# Patient Record
Sex: MALE | Race: WHITE | NOT HISPANIC OR LATINO | Employment: OTHER | URBAN - METROPOLITAN AREA
[De-identification: names, ages, dates, MRNs, and addresses within clinical notes are randomized per-mention and may not be internally consistent; named-entity substitution may affect disease eponyms.]

---

## 2016-12-28 LAB — INR PPP: 2.9 (ref 2–3.5)

## 2017-01-03 ENCOUNTER — ANTICOAGULATION MONITORING (OUTPATIENT)
Dept: VASCULAR LAB | Facility: MEDICAL CENTER | Age: 82
End: 2017-01-03

## 2017-01-03 DIAGNOSIS — I26.99 PULMONARY EMBOLISM, OTHER: ICD-10-CM

## 2017-01-03 NOTE — PROGRESS NOTES
Anticoagulation Summary as of 1/3/2017     INR goal 2.0-3.0   Selected INR 2.9 (12/28/2016)   Maintenance plan 2.5 mg (2.5 mg x 1) every day   Weekly total 17.5 mg   No change documented Mynor Capellan Med Ass't   Plan last modified Parisa Vences, PHARMD (6/11/2015)   Next INR check 3/8/2017   Target end date Indefinite    Indications   Pulmonary embolism [415.19] [I26.99]         Anticoagulation Episode Summary     INR check location Outside Lab    Preferred lab     Send INR reminders to     Comments [facility]      Anticoagulation Care Providers     Provider Role Specialty Phone number    SHERLY Cope Responsible Cardiology [phone]    SHERLY Garcia Responsible Cardiology [phone]        Anticoagulation Patient Findings   Negatives Missed Doses, Extra Doses, Medication Changes, Antibiotic Use, Diet Changes, Dental/Other Procedures, Hospitalization, Bleeding Gums, Nose Bleeds, Blood in Urine, Blood in Stool, Any Bruising, Other Complaints        Spoke with patient to report a therapeutic INR.  Pt instructed to continue with current warfarin dosing regimen. Pt denies any s/s of bleeding, bruising, clotting or any changes to diet or medication.  Will follow up in 10 weeks.    Mynor Capellan Med Ass't    Agree with plan of care as stated above.    Tanya HEARD

## 2017-03-08 LAB — INR PPP: 2.4 (ref 2–3.5)

## 2017-03-10 ENCOUNTER — ANTICOAGULATION MONITORING (OUTPATIENT)
Dept: VASCULAR LAB | Facility: MEDICAL CENTER | Age: 82
End: 2017-03-10

## 2017-03-10 DIAGNOSIS — I26.99 PULMONARY EMBOLISM, OTHER: ICD-10-CM

## 2017-03-11 NOTE — PROGRESS NOTES
Anticoagulation Summary as of 3/10/2017     INR goal 2.0-3.0   Selected INR 2.4 (3/8/2017)   Maintenance plan 2.5 mg (2.5 mg x 1) every day   Weekly total 17.5 mg   No change documented Mynor Capellan Med Ass't   Plan last modified Parisa Vences, PHARMD (6/11/2015)   Next INR check 5/17/2017   Target end date Indefinite    Indications   Pulmonary embolism [415.19] [I26.99]         Anticoagulation Episode Summary     INR check location Outside Lab    Preferred lab     Send INR reminders to     Comments [facility]      Anticoagulation Care Providers     Provider Role Specialty Phone number    SHERLY Cope Responsible Cardiology [phone]    SHERLY Garcia Responsible Cardiology [phone]        Anticoagulation Patient Findings   Negatives Missed Doses, Extra Doses, Medication Changes, Antibiotic Use, Diet Changes, Dental/Other Procedures, Hospitalization, Bleeding Gums, Nose Bleeds, Blood in Urine, Blood in Stool, Any Bruising, Other Complaints        Spoke with patient to report a therapeutic INR.  Pt instructed to continue with current warfarin dosing regimen. Pt denies any s/s of bleeding, bruising, clotting or any changes to diet or medication.  Will follow up in 10 weeks.    Mynor Capellan Med Ass't    Agree with plan of care as stated above.    Tanya HEARD

## 2017-05-10 LAB — INR PPP: 3 (ref 2–3.5)

## 2017-05-25 ENCOUNTER — ANTICOAGULATION MONITORING (OUTPATIENT)
Dept: VASCULAR LAB | Facility: MEDICAL CENTER | Age: 82
End: 2017-05-25

## 2017-05-25 DIAGNOSIS — I27.82 CHRONIC SEPTIC PULMONARY EMBOLISM WITH ACUTE COR PULMONALE (HCC): ICD-10-CM

## 2017-05-25 DIAGNOSIS — I26.01 CHRONIC SEPTIC PULMONARY EMBOLISM WITH ACUTE COR PULMONALE (HCC): ICD-10-CM

## 2017-05-25 NOTE — PROGRESS NOTES
Anticoagulation Summary as of 5/25/2017     INR goal 2.0-3.0   Selected INR 3.0 (5/10/2017)   Maintenance plan 2.5 mg (2.5 mg x 1) every day   Weekly total 17.5 mg   Plan last modified Parisa Vences, PHARMD (6/11/2015)   Next INR check 8/2/2017   Target end date Indefinite    Indications   Pulmonary embolism [415.19] [I26.99]         Anticoagulation Episode Summary     INR check location Outside Lab    Preferred lab     Send INR reminders to     [facility]      Anticoagulation Care Providers     Provider Role Specialty Phone number    SHERLY Cope Responsible Cardiology [phone]    SHERLY Garcia Responsible Cardiology [phone]        Anticoagulation Patient Findings    Spoke with Mrs. Louise to report a therapeutic INR of 3.0 (from 5-10). Pt is to continue with current warfarin dosing regimen.  Pt denies any unusual s/s of bleeding, bruising, clotting or any changes to diet or medications.  Follow up in 12 weeks.    Claire Melendez, PHARMD

## 2017-07-05 ENCOUNTER — ANTICOAGULATION MONITORING (OUTPATIENT)
Dept: VASCULAR LAB | Facility: MEDICAL CENTER | Age: 82
End: 2017-07-05

## 2017-07-05 DIAGNOSIS — I27.82 CHRONIC SEPTIC PULMONARY EMBOLISM WITH ACUTE COR PULMONALE (HCC): ICD-10-CM

## 2017-07-05 DIAGNOSIS — I26.01 CHRONIC SEPTIC PULMONARY EMBOLISM WITH ACUTE COR PULMONALE (HCC): ICD-10-CM

## 2017-07-05 LAB — INR PPP: 2.1 (ref 2–3.5)

## 2017-07-05 NOTE — PROGRESS NOTES
Anticoagulation Summary as of 7/5/2017     INR goal 2.0-3.0   Selected INR 2.1 (7/5/2017)   Maintenance plan 2.5 mg (2.5 mg x 1) every day   Weekly total 17.5 mg   Plan last modified Parisa Vences, PHARMD (6/11/2015)   Next INR check 7/19/2017   Target end date Indefinite    Indications   Pulmonary embolism [415.19] [I26.99]         Anticoagulation Episode Summary     INR check location Outside Lab    Preferred lab     Send INR reminders to     [facility]      Anticoagulation Care Providers     Provider Role Specialty Phone number    SHERLY Cope Responsible Cardiology [phone]    SHERLY Garcia Responsible Cardiology [phone]        Anticoagulation Patient Findings    Spoke to the patient's wife on the phone. Patients wife states that patient had been having some blood in his urine which took him to the hospital. He was then instructed by PCP to hold warfarin for a couple of days until bleeding resolves. He was also started on an antibiotic Patient's INR is therapeutic today at 2.1. Patient was instructed to recheck INR 72 hours after re-starting warfarin. Follow-up in 1 week(s).    Lina Frazier.D

## 2017-07-06 ENCOUNTER — ANTICOAGULATION MONITORING (OUTPATIENT)
Dept: VASCULAR LAB | Facility: MEDICAL CENTER | Age: 82
End: 2017-07-06

## 2017-07-06 DIAGNOSIS — I27.82 CHRONIC SEPTIC PULMONARY EMBOLISM WITH ACUTE COR PULMONALE (HCC): ICD-10-CM

## 2017-07-06 DIAGNOSIS — I26.01 CHRONIC SEPTIC PULMONARY EMBOLISM WITH ACUTE COR PULMONALE (HCC): ICD-10-CM

## 2017-08-02 ENCOUNTER — TELEPHONE (OUTPATIENT)
Dept: VASCULAR LAB | Facility: MEDICAL CENTER | Age: 82
End: 2017-08-02

## 2017-08-02 NOTE — TELEPHONE ENCOUNTER
Spoke to patients wife to follow up on hematuria, she states that this has resolved. Instructed patient to re-check INR as soon as possible, this will be done next week.    Rachael Rich, Pharm.D

## 2017-08-16 ENCOUNTER — TELEPHONE (OUTPATIENT)
Dept: VASCULAR LAB | Facility: MEDICAL CENTER | Age: 82
End: 2017-08-16

## 2017-08-23 NOTE — PROGRESS NOTES
Spoke with the pt . His MD Dr. Richey in [place] has the pt off Warfarin and is going to test him again on 9/18/  RENATE Lees.

## 2017-09-16 LAB — INR PPP: 1.5 (ref 2–3.5)

## 2017-09-18 ENCOUNTER — ANTICOAGULATION MONITORING (OUTPATIENT)
Dept: VASCULAR LAB | Facility: MEDICAL CENTER | Age: 82
End: 2017-09-18

## 2017-09-18 NOTE — PROGRESS NOTES
Anticoagulation Summary  As of 9/18/2017    INR goal:   2.0-3.0   TTR:   85.7 % (2.2 y)   Today's INR:   1.5! (9/16/2017)   Maintenance plan:   2.5 mg (2.5 mg x 1) every day   Weekly total:   17.5 mg   Plan last modified:   Parisa Vences, PharmD (6/11/2015)   Next INR check:   9/25/2017   Target end date:   Indefinite    Indications    Pulmonary embolism [415.19] [I26.99]             Anticoagulation Episode Summary     INR check location:   Outside Lab    Preferred lab:       Send INR reminders to:       Comments:   [facility]      Anticoagulation Care Providers     Provider Role Specialty Phone number    OLIVIA CopePVereniceNVerenice Responsible Cardiology [phone]    SHERLY Garcia Responsible Cardiology [phone]        Anticoagulation Patient Findings    Spoke with patient.  INR is SUB therapeutic.   Pt denies any unusual s/s of bleeding, bruising, clotting or any changes to diet or medications. Denies any etoh, cranberries, supplements, or illness.   Pt verifies warfarin weekly dosing.   Clot hx PE in 2015    Will have pt take a boost dose of 5mg x1 today and then resume his normal dose.     Repeat INR in 3 days.     Dejah Teague, PharmD

## 2017-09-26 ENCOUNTER — ANTICOAGULATION MONITORING (OUTPATIENT)
Dept: VASCULAR LAB | Facility: MEDICAL CENTER | Age: 82
End: 2017-09-26

## 2017-09-26 LAB — INR PPP: 2.8 (ref 2–3.5)

## 2017-09-26 NOTE — PROGRESS NOTES
Anticoagulation Summary  As of 9/26/2017    INR goal:   2.0-3.0   TTR:   85.4 % (2.3 y)   Today's INR:   2.8   Maintenance plan:   2.5 mg (2.5 mg x 1) every day   Weekly total:   17.5 mg   Plan last modified:   Parisa Vences, PharmD (6/11/2015)   Next INR check:   10/24/2017   Target end date:   Indefinite    Indications    Pulmonary embolism [415.19] [I26.99]             Anticoagulation Episode Summary     INR check location:   Outside Lab    Preferred lab:       Send INR reminders to:       Comments:   [facility]      Anticoagulation Care Providers     Provider Role Specialty Phone number    Daisy Patrick AVereniceP.N. Responsible Cardiology [phone]    OLIVIA GarciaPVereniceNVerenice Responsible Cardiology [phone]        Anticoagulation Patient Findings    Spoke with patient to report a therapeutic INR.    Pt instructed to continue with current warfarin dosing regimen, confirms dosing.   Pt denies any s/s of bleeding, bruising, clotting or any changes to diet or medication.    Will follow up in 5 weeks.     Dejah Teague, PharmD

## 2017-10-02 ENCOUNTER — ANTICOAGULATION MONITORING (OUTPATIENT)
Dept: VASCULAR LAB | Facility: MEDICAL CENTER | Age: 82
End: 2017-10-02

## 2017-10-02 LAB — INR PPP: 2 (ref 2–3.5)

## 2017-10-02 NOTE — PROGRESS NOTES
OP Anticoagulation Telephone Note    Date: 10/2/2017  Anticoagulation Summary  As of 10/2/2017    INR goal:   2.0-3.0   TTR:   85.5 % (2.3 y)   Today's INR:   2.0   Maintenance plan:   2.5 mg (2.5 mg x 1) every day   Weekly total:   17.5 mg   No change documented:   Chapin Coreas Ass't   Plan last modified:   Parisa Vences, PharmD (6/11/2015)   Next INR check:   11/6/2017   Target end date:   Indefinite    Indications    Pulmonary embolism [415.19] [I26.99]             Anticoagulation Episode Summary     INR check location:   Outside Lab    Preferred lab:       Send INR reminders to:       Comments:   [facility]      Anticoagulation Care Providers     Provider Role Specialty Phone number    STEVE Cope.P.NVerenice Responsible Cardiology [phone]    SHERLY Garcia Responsible Cardiology [phone]        Anticoagulation Patient Findings  Patient Findings     Negatives:   Signs/symptoms of thrombosis, Signs/symptoms of bleeding, Laboratory test error suspected, Change in health, Change in alcohol use, Change in activity, Upcoming invasive procedure, Emergency department visit, Upcoming dental procedure, Missed doses, Extra doses, Change in medications, Change in diet/appetite, Hospital admission, Bruising, Other complaints      Plan:  Spoke with patient and patient wife on the phone. Patient is therapeutic today. Patient denies any changes in medications or diet. Patient denies any signs or symptoms of bleeding or clotting. Instructed patient to call clinic if any unusual bleeding or bruising occurs. Will continue dosing as outlined above. Will follow-up with patient in 5 weeks.    Aisha Suarez, Medical Assistant      Agree with above plan.    ORQUIDEA Mari

## 2017-10-12 LAB — INR PPP: 2 (ref 2–3.5)

## 2017-10-13 ENCOUNTER — ANTICOAGULATION MONITORING (OUTPATIENT)
Dept: VASCULAR LAB | Facility: MEDICAL CENTER | Age: 82
End: 2017-10-13

## 2017-10-13 DIAGNOSIS — I26.99 OTHER PULMONARY EMBOLISM WITHOUT ACUTE COR PULMONALE, UNSPECIFIED CHRONICITY (HCC): ICD-10-CM

## 2017-10-13 NOTE — PROGRESS NOTES
Anticoagulation Summary  As of 10/13/2017    INR goal:   2.0-3.0   TTR:   85.6 % (2.3 y)   Today's INR:   2.0 (10/12/2017)   Maintenance plan:   2.5 mg (2.5 mg x 1) every day   Weekly total:   17.5 mg   No change documented:   Mynor Capellan, Med Ass't   Plan last modified:   Parisa Vences, PharmD (6/11/2015)   Next INR check:   11/16/2017   Target end date:   Indefinite    Indications    Pulmonary embolism [415.19] [I26.99]             Anticoagulation Episode Summary     INR check location:   Outside Lab    Preferred lab:       Send INR reminders to:       Comments:   [facility]      Anticoagulation Care Providers     Provider Role Specialty Phone number    Daisy Patrick A.P.N. Responsible Cardiology [phone]    SHERLY Garcia Responsible Cardiology [phone]        Anticoagulation Patient Findings  Patient Findings     Negatives:   Signs/symptoms of thrombosis, Signs/symptoms of bleeding, Laboratory test error suspected, Change in health, Change in alcohol use, Change in activity, Upcoming invasive procedure, Emergency department visit, Upcoming dental procedure, Missed doses, Extra doses, Change in medications, Change in diet/appetite, Hospital admission, Bruising, Other complaints        Spoke with patient who states that PCP was also dosing patient. Called patients PCP, Dr. Shan Richey who states that they are temporarily taking over patients warfarin. Will defer all management to PCP. Spoke with Fab about patient.    Mynor HEARTRaygnee, Med Ass't

## 2017-10-30 ENCOUNTER — ANTICOAGULATION MONITORING (OUTPATIENT)
Dept: VASCULAR LAB | Facility: MEDICAL CENTER | Age: 82
End: 2017-10-30

## 2017-12-06 DIAGNOSIS — I26.99 OTHER PULMONARY EMBOLISM WITHOUT ACUTE COR PULMONALE, UNSPECIFIED CHRONICITY (HCC): ICD-10-CM

## 2018-01-08 ENCOUNTER — ANTICOAGULATION MONITORING (OUTPATIENT)
Dept: VASCULAR LAB | Facility: MEDICAL CENTER | Age: 83
End: 2018-01-08

## 2018-01-08 ENCOUNTER — TELEPHONE (OUTPATIENT)
Dept: VASCULAR LAB | Facility: MEDICAL CENTER | Age: 83
End: 2018-01-08

## 2018-01-08 LAB — INR PPP: 2.8 (ref 2–3.5)

## 2018-01-08 NOTE — TELEPHONE ENCOUNTER
Patient called today stating Dr. Richey, his pcp, wanted us to manage his warfarin going forward. Called and received result from [facility], will get it to a pharmacist for dosing.

## 2018-01-09 NOTE — PROGRESS NOTES
OP Anticoagulation Telephone Note    Date: 1/8/2018  Anticoagulation Summary  As of 1/8/2018    INR goal:   2.0-3.0   TTR:   100.0 % (1.3 mo)   Today's INR:   2.8 (1/3/2018)   Maintenance plan:   No maintenance plan   No change documented:   Chapin Coreas Ass't   Next INR check:   1/31/2018   Target end date:   Indefinite         Anticoagulation Episode Summary     INR check location:       Preferred lab:       Send INR reminders to:       Comments:         Anticoagulation Care Providers     Provider Role Specialty Phone number    SHERLY Lees Responsible Cardiology [phone]        Anticoagulation Patient Findings  Patient Findings     Negatives:   Signs/symptoms of thrombosis, Signs/symptoms of bleeding, Laboratory test error suspected, Change in health, Change in alcohol use, Change in activity, Upcoming invasive procedure, Emergency department visit, Upcoming dental procedure, Missed doses, Extra doses, Change in medications, Change in diet/appetite, Hospital admission, Bruising, Other complaints      Plan:  Spoke with patient on the phone. Patient is therapeutic today. Patient denies any changes in medications or diet. Patient denies any signs or symptoms of bleeding or clotting. Instructed patient to call clinic if any unusual bleeding or bruising occurs. Will continue dosing as outlined above. Will follow-up with patient in 4 weeks.    Aisha Suarez, Medical Assistant   Concur with dose and testing regimen.  SHERLY Lees

## 2018-01-31 ENCOUNTER — ANTICOAGULATION MONITORING (OUTPATIENT)
Dept: VASCULAR LAB | Facility: MEDICAL CENTER | Age: 83
End: 2018-01-31

## 2018-01-31 LAB
INR PPP: 2.7 (ref 2–3.5)
INR PPP: 2.7 (ref 2–3.5)

## 2018-01-31 NOTE — PROGRESS NOTES
OP Anticoagulation Telephone Note    Date: 1/31/2018  Anticoagulation Summary  As of 1/31/2018    INR goal:   2.0-3.0   TTR:   100.0 % (2.2 mo)   Today's INR:   2.7   Maintenance plan:   2.5 mg (2.5 mg x 1) every day   Weekly total:   17.5 mg   No change documented:   Aisha Suarez, Med Ass't   Plan last modified:   Dejah Teague, PharmD (1/31/2018)   Next INR check:   2/28/2018   Target end date:   Indefinite         Anticoagulation Episode Summary     INR check location:       Preferred lab:       Send INR reminders to:       Comments:         Anticoagulation Care Providers     Provider Role Specialty Phone number    SHERLY Lees [facility] Cardiology [phone]        Anticoagulation Patient Findings  Patient Findings     Negatives:   Signs/symptoms of thrombosis, Signs/symptoms of bleeding, Laboratory test error suspected, Change in health, Change in alcohol use, Change in activity, Upcoming invasive procedure, Emergency department visit, Upcoming dental procedure, Missed doses, Extra doses, Change in medications, Change in diet/appetite, Hospital admission, Bruising, Other complaints      Plan:  Spoke with patient on the phone. Patient is therapeutic today. Patient denies any changes in medications or diet. Patient denies any signs or symptoms of bleeding or clotting. Instructed patient to call clinic if any unusual bleeding or bruising occurs. Will continue dosing as outlined above. Will follow-up with patient in 4 weeks.    Aisha Suarez, Medical Assistant    I have reviewed and agree with the plan above on 02/02/18    Jared Thornton, LinaD

## 2018-02-28 ENCOUNTER — ANTICOAGULATION MONITORING (OUTPATIENT)
Dept: VASCULAR LAB | Facility: MEDICAL CENTER | Age: 83
End: 2018-02-28

## 2018-02-28 LAB — INR PPP: 2.7 (ref 2–3.5)

## 2018-03-28 ENCOUNTER — ANTICOAGULATION MONITORING (OUTPATIENT)
Dept: VASCULAR LAB | Facility: MEDICAL CENTER | Age: 83
End: 2018-03-28

## 2018-03-28 LAB — INR PPP: 2.7 (ref 2–3.5)

## 2018-03-28 NOTE — PROGRESS NOTES
OP Anticoagulation Telephone Note    Date: 3/28/2018  Anticoagulation Summary  As of 3/28/2018    INR goal:   2.0-3.0   TTR:   100.0 % (4.1 mo)   Today's INR:   2.7   Maintenance plan:   2.5 mg (2.5 mg x 1) every day   Weekly total:   17.5 mg   No change documented:   Aisha Suarez, Med Ass't   Plan last modified:   Dejah Teague, PharmD (1/31/2018)   Next INR check:   4/24/2018   Target end date:   Indefinite         Anticoagulation Episode Summary     INR check location:       Preferred lab:       Send INR reminders to:       Comments:         Anticoagulation Care Providers     Provider Role Specialty Phone number    SHERLY Lees [facility] Cardiology [phone]        Anticoagulation Patient Findings  Patient Findings     Negatives:   Signs/symptoms of thrombosis, Signs/symptoms of bleeding, Laboratory test error suspected, Change in health, Change in alcohol use, Change in activity, Upcoming invasive procedure, Emergency department visit, Upcoming dental procedure, Missed doses, Extra doses, Change in medications, Change in diet/appetite, Hospital admission, Bruising, Other complaints      Plan:  Spoke with patient on the phone. Patient is therapeutic today. Patient denies any changes in medications or diet. Patient denies any signs or symptoms of bleeding or clotting. Instructed patient to call clinic if any unusual bleeding or bruising occurs. Will continue dosing as outlined above. Will follow-up with patient in 4 weeks.    Aisha Suarez, Medical Assistant    I have reviewed and agree with the plan above on 03/28/2018      Claire Melendez, PharmD

## 2018-04-25 ENCOUNTER — ANTICOAGULATION MONITORING (OUTPATIENT)
Dept: VASCULAR LAB | Facility: MEDICAL CENTER | Age: 83
End: 2018-04-25

## 2018-04-25 LAB — INR PPP: 2.9 (ref 2–3.5)

## 2018-04-25 NOTE — PROGRESS NOTES
Anticoagulation Summary  As of 4/25/2018    INR goal:   2.0-3.0   TTR:   100.0 % (5 mo)   Today's INR:   2.9   Warfarin maintenance plan:   2.5 mg (2.5 mg x 1) every day   Weekly warfarin total:   17.5 mg   Plan last modified:   Dejah Teague, PharmD (1/31/2018)   Next INR check:   6/6/2018   Target end date:   Indefinite    Indications    Pulmonary embolism [415.19] [I26.99]             Anticoagulation Episode Summary     INR check location:       Preferred lab:       Send INR reminders to:       Comments:         Anticoagulation Care Providers     Provider Role Specialty Phone number    SHERLY Lees Responsible Cardiology [phone]        Anticoagulation Patient Findings    Spoke with patient's wife to report a therapeutic INR.    Pt instructed to continue with current warfarin dosing regimen, confirms dosing.   Pt denies any s/s of bleeding, bruising, clotting or any changes to diet or medication.    Will follow up in 6 week(s).     Dejah Teague, PharmD

## 2018-06-06 ENCOUNTER — ANTICOAGULATION MONITORING (OUTPATIENT)
Dept: VASCULAR LAB | Facility: MEDICAL CENTER | Age: 83
End: 2018-06-06

## 2018-06-06 LAB — INR PPP: 3.1 (ref 2–3.5)

## 2018-06-07 NOTE — PROGRESS NOTES
OP Telephone Anticoagulation Service Note    Date: 6/7/2018      Anticoagulation Summary  As of 6/6/2018    INR goal:   2.0-3.0   TTR:   89.1 % (6.4 mo)   Today's INR:   3.1!   Warfarin maintenance plan:   2.5 mg (2.5 mg x 1) every day   Weekly warfarin total:   17.5 mg   Plan last modified:   Dejah Teague, PharmD (1/31/2018)   Next INR check:      Target end date:   Indefinite    Indications    Pulmonary embolism [415.19] [I26.99]             Anticoagulation Episode Summary     INR check location:       Preferred lab:       Send INR reminders to:       Comments:         Anticoagulation Care Providers     Provider Role Specialty Phone number    SHERLY Lees Responsible Cardiology [phone]        Anticoagulation Patient Findings    Spoke with patient on the phone.  Confirmed dosing. No missed tablets in the last week. Patient denies any changes in medications or diet. Patient denies any signs or symptoms of bleeding or clotting.    Plan: INR 3.1. Patient is SUPRA-therapeutic today.  Instructed pt to take a half tablet today only then resume previous regimen as noted   Instructed patient to call clinic if any unusual bleeding or bruising occurs.  Will follow-up with patient in 2 week(s).      Randy Hackett, Pharmacy Intern     I have reviewed and agree with the plan above on 06/07/18    Jared Thornton, LinaD

## 2018-06-20 DIAGNOSIS — Z79.01 CHRONIC ANTICOAGULATION: ICD-10-CM

## 2018-06-27 ENCOUNTER — ANTICOAGULATION MONITORING (OUTPATIENT)
Dept: VASCULAR LAB | Facility: MEDICAL CENTER | Age: 83
End: 2018-06-27

## 2018-06-27 DIAGNOSIS — I26.99 OTHER PULMONARY EMBOLISM WITHOUT ACUTE COR PULMONALE, UNSPECIFIED CHRONICITY (HCC): ICD-10-CM

## 2018-06-27 DIAGNOSIS — Z79.01 CHRONIC ANTICOAGULATION: ICD-10-CM

## 2018-06-27 LAB — INR PPP: 3.1 (ref 2–3.5)

## 2018-06-27 NOTE — PROGRESS NOTES
Anticoagulation Summary  As of 6/27/2018    INR goal:   2.0-3.0   TTR:   80.3 % (7.1 mo)   Today's INR:   3.1!   Warfarin maintenance plan:   1.25 mg (2.5 mg x 0.5) on Thu; 2.5 mg (2.5 mg x 1) all other days   Weekly warfarin total:   16.25 mg   Plan last modified:   Keenan Griggs, Marlon (6/27/2018)   Next INR check:   7/11/2018   Target end date:   Indefinite    Indications    Pulmonary embolism [415.19] [I26.99]             Anticoagulation Episode Summary     INR check location:       Preferred lab:       Send INR reminders to:       Comments:         Anticoagulation Care Providers     Provider Role Specialty Phone number    SHERLY Lees [facility] Cardiology [phone]        Anticoagulation Patient Findings  Patient Findings     Negatives:   Signs/symptoms of thrombosis, Signs/symptoms of bleeding, Laboratory test error suspected, Change in health, Change in alcohol use, Change in activity, Upcoming invasive procedure, Emergency department visit, Upcoming dental procedure, Missed doses, Extra doses, Change in medications, Change in diet/appetite, Hospital admission, Bruising, Other complaints        Spoke with patient today regarding supratherapeutic INR of 3.1.  Patient denies any signs/symptoms of bruising or bleeding or any changes in diet and medications.  Instructed patient to call clinic with any questions or concerns.  Instructed patient to decrease weekly warfarin regimen by ~7% as detailed above.  Follow up in 2 weeks, to reduce risk of adverse events related to this high risk medication,  Warfarin.    Keenan Griggs, LinaD

## 2018-07-11 ENCOUNTER — ANTICOAGULATION MONITORING (OUTPATIENT)
Dept: VASCULAR LAB | Facility: MEDICAL CENTER | Age: 83
End: 2018-07-11

## 2018-07-11 DIAGNOSIS — I26.99 OTHER PULMONARY EMBOLISM WITHOUT ACUTE COR PULMONALE, UNSPECIFIED CHRONICITY (HCC): ICD-10-CM

## 2018-07-11 LAB — INR PPP: 2.8 (ref 2–3.5)

## 2018-07-11 NOTE — PROGRESS NOTES
Anticoagulation Summary  As of 7/11/2018    INR goal:   2.0-3.0   TTR:   79.4 % (7.6 mo)   Today's INR:   2.8   Warfarin maintenance plan:   1.25 mg (2.5 mg x 0.5) on Thu; 2.5 mg (2.5 mg x 1) all other days   Weekly warfarin total:   16.25 mg   Plan last modified:   Keenan Griggs, PharmD (6/27/2018)   Next INR check:   7/25/2018   Target end date:   Indefinite    Indications    Pulmonary embolism [415.19] [I26.99]             Anticoagulation Episode Summary     INR check location:       Preferred lab:       Send INR reminders to:       Comments:         Anticoagulation Care Providers     Provider Role Specialty Phone number    SHERLY Lees [facility] Cardiology [phone]        Anticoagulation Patient Findings  Patient Findings     Negatives:   Signs/symptoms of thrombosis, Signs/symptoms of bleeding, Laboratory test error suspected, Change in health, Change in alcohol use, Change in activity, Upcoming invasive procedure, Emergency department visit, Upcoming dental procedure, Missed doses, Extra doses, Change in medications, Change in diet/appetite, Hospital admission, Bruising, Other complaints        Spoke with patient today regarding therapeutic INR of 2.8.  Patient denies any signs/symptoms of bruising or bleeding or any changes in diet and medications.  Instructed patient to call clinic with any questions or concerns.  Pt is to continue with current warfarin dosing regimen.  Follow up in 2 weeks, to reduce risk of adverse events related to this high risk medication,  Warfarin.    Keenan Griggs, PharmD

## 2018-08-01 ENCOUNTER — ANTICOAGULATION MONITORING (OUTPATIENT)
Dept: VASCULAR LAB | Facility: MEDICAL CENTER | Age: 83
End: 2018-08-01

## 2018-08-01 LAB — INR PPP: 2.9 (ref 2–3.5)

## 2018-08-02 NOTE — PROGRESS NOTES
OP Anticoagulation Service Note    Date: 8/2/2018  Anticoagulation Summary  As of 8/1/2018    INR goal:   2.0-3.0   TTR:   81.2 % (8.3 mo)   Today's INR:   2.9   Warfarin maintenance plan:   1.25 mg (2.5 mg x 0.5) on Thu; 2.5 mg (2.5 mg x 1) all other days   Weekly warfarin total:   16.25 mg   No change documented:   Chapin Correia Ass't   Plan last modified:   Lina JohnsonD (6/27/2018)   Next INR check:   8/23/2018   Target end date:   Indefinite    Indications    Pulmonary embolism [415.19] [I26.99]             Anticoagulation Episode Summary     INR check location:       Preferred lab:       Send INR reminders to:       Comments:         Anticoagulation Care Providers     Provider Role Specialty Phone number    SHERLY Lees [facility] Cardiology [phone]        Anticoagulation Patient Findings  Patient Findings     Negatives:   Signs/symptoms of thrombosis, Signs/symptoms of bleeding, Laboratory test error suspected, Change in health, Change in alcohol use, Change in activity, Upcoming invasive procedure, Emergency department visit, Upcoming dental procedure, Missed doses, Extra doses, Change in medications, Change in diet/appetite, Hospital admission, Bruising, Other complaints        Plan: Spoke to patient. Patient is therapeutic and will remain on the same dose. Patient reports no unusual bleeding or bruising and no changes to medication or diet. Patient is to be checked again in 3 weeks.    Chapin Correia. Ass't   [facility]    I have reviewed and agree with the plan above on 08/02/2018      Claire Melendez, LinaD

## 2018-08-09 ENCOUNTER — TELEPHONE (OUTPATIENT)
Dept: VASCULAR LAB | Facility: MEDICAL CENTER | Age: 83
End: 2018-08-09

## 2018-08-28 ENCOUNTER — ANTICOAGULATION MONITORING (OUTPATIENT)
Dept: VASCULAR LAB | Facility: MEDICAL CENTER | Age: 83
End: 2018-08-28

## 2018-08-28 DIAGNOSIS — I26.99 OTHER PULMONARY EMBOLISM WITHOUT ACUTE COR PULMONALE, UNSPECIFIED CHRONICITY (HCC): ICD-10-CM

## 2018-08-28 LAB — INR PPP: 2.2 (ref 2–3.5)

## 2018-08-28 NOTE — PROGRESS NOTES
OP Anticoagulation Service Note    Date: 8/28/2018  Anticoagulation Summary  As of 8/28/2018    INR goal:   2.0-3.0   TTR:   83.0 % (9.2 mo)   Today's INR:   2.2   Warfarin maintenance plan:   1.25 mg (2.5 mg x 0.5) on Thu; 2.5 mg (2.5 mg x 1) all other days   Weekly warfarin total:   16.25 mg   No change documented:   Chapin Correia Ass't   Plan last modified:   Keenan Griggs, PharmD (6/27/2018)   Next INR check:   9/25/2018   Target end date:   Indefinite    Indications    Pulmonary embolism [415.19] [I26.99]             Anticoagulation Episode Summary     INR check location:       Preferred lab:       Send INR reminders to:       Comments:         Anticoagulation Care Providers     Provider Role Specialty Phone number    SHERLY Lees [facility] Cardiology [phone]        Anticoagulation Patient Findings  Patient Findings     Negatives:   Signs/symptoms of thrombosis, Signs/symptoms of bleeding, Laboratory test error suspected, Change in health, Change in alcohol use, Change in activity, Upcoming invasive procedure, Emergency department visit, Upcoming dental procedure, Missed doses, Extra doses, Change in medications, Change in diet/appetite, Hospital admission, Bruising, Other complaints        Spoke with patient today regarding therapeutic INR of 2.2.  Patient denies any signs/symptoms of bruising or bleeding or any changes in diet and medications.  Instructed patient to call clinic with any questions or concerns.  Pt is to continue with current warfarin dosing regimen.  Follow up in 4 weeks, to reduce risk of adverse events related to this high risk medication,  Warfarin.    Keenan Griggs, PharmD

## 2018-09-26 ENCOUNTER — ANTICOAGULATION MONITORING (OUTPATIENT)
Dept: VASCULAR LAB | Facility: MEDICAL CENTER | Age: 83
End: 2018-09-26

## 2018-09-26 DIAGNOSIS — I26.99 OTHER PULMONARY EMBOLISM WITHOUT ACUTE COR PULMONALE, UNSPECIFIED CHRONICITY (HCC): ICD-10-CM

## 2018-09-26 LAB — INR PPP: 2.7 (ref 2–3.5)

## 2018-09-26 NOTE — PROGRESS NOTES
Anticoagulation Summary  As of 9/26/2018    INR goal:   2.0-3.0   TTR:   84.6 % (10.1 mo)   Today's INR:   2.7   Warfarin maintenance plan:   1.25 mg (2.5 mg x 0.5) on Thu; 2.5 mg (2.5 mg x 1) all other days   Weekly warfarin total:   16.25 mg   No change documented:   Aisha Suarez, Med Ass't   Plan last modified:   Keenan Griggs, Marlon (6/27/2018)   Next INR check:   10/24/2018   Target end date:   Indefinite    Indications    Pulmonary embolism [415.19] [I26.99]             Anticoagulation Episode Summary     INR check location:       Preferred lab:       Send INR reminders to:       Comments:         Anticoagulation Care Providers     Provider Role Specialty Phone number    SHERLY Lees [facility] Cardiology [phone]      Plan: Spoke with patient on the phone. Patient is therapeutic today. Patient denies any changes in medications or diet. Patient denies any signs or symptoms of bleeding or clotting. Instructed patient to call clinic if any unusual bleeding or bruising occurs. Will continue dosing as outlined above. Will follow-up with patient in 4 weeks.    Aisha Suarez, Medical Assistant    I have reviewed and am in agreement with the above stated plan on 9-26-18.  Keenan Griggs, LinaD

## 2018-10-24 LAB — INR PPP: 3 (ref 2–3.5)

## 2018-10-25 ENCOUNTER — ANTICOAGULATION MONITORING (OUTPATIENT)
Dept: VASCULAR LAB | Facility: MEDICAL CENTER | Age: 83
End: 2018-10-25

## 2018-10-25 NOTE — PROGRESS NOTES
OP Telephone Anticoagulation Service Note    Date: 10/25/2018      Anticoagulation Summary  As of 10/25/2018    INR goal:   2.0-3.0   TTR:   85.9 % (11.1 mo)   Today's INR:   3.0 (10/24/2018)   Warfarin maintenance plan:   1.25 mg (2.5 mg x 0.5) on Thu; 2.5 mg (2.5 mg x 1) all other days   Weekly warfarin total:   16.25 mg   Plan last modified:   Keenan Griggs, PharmD (6/27/2018)   Next INR check:   11/8/2018   Target end date:   Indefinite    Indications    Pulmonary embolism [415.19] [I26.99]             Anticoagulation Episode Summary     INR check location:       Preferred lab:       Send INR reminders to:       Comments:         Anticoagulation Care Providers     Provider Role Specialty Phone number    SHERLY Lees [facility] Cardiology [phone]        Anticoagulation Patient Findings        Plan: Spoke with patient on the phone. Patient is therapeutic today. Confirmed dosing. Pt was off warfarin last week for bladder procedure. He is currently on a cephalosporin abx. . Patient denies any signs or symptoms of bleeding or clotting. Instructed patient to call clinic if any unusual bleeding or bruising occurs. Will continue dosing as outlined. Will follow-up with patient in 2 week(s) as INR increased quickly after resuming. Discussed to always let our clinic know if he will be stopping warfarin or if he gets started on a new medication        Lisa Encinas, PharmD

## 2018-11-07 LAB — INR PPP: 2.3 (ref 2–3.5)

## 2018-11-12 ENCOUNTER — ANTICOAGULATION MONITORING (OUTPATIENT)
Dept: VASCULAR LAB | Facility: MEDICAL CENTER | Age: 83
End: 2018-11-12

## 2018-11-12 NOTE — PROGRESS NOTES
Anticoagulation Summary  As of 11/12/2018    INR goal:   2.0-3.0   TTR:   86.5 % (11.5 mo)   Today's INR:   2.3 (11/7/2018)   Warfarin maintenance plan:   1.25 mg (2.5 mg x 0.5) on Thu; 2.5 mg (2.5 mg x 1) all other days   Weekly warfarin total:   16.25 mg   Plan last modified:   Keenan Griggs, PharmD (6/27/2018)   Next INR check:   12/10/2018   Target end date:   Indefinite    Indications    Pulmonary embolism [415.19] [I26.99]             Anticoagulation Episode Summary     INR check location:       Preferred lab:       Send INR reminders to:       Comments:         Anticoagulation Care Providers     Provider Role Specialty Phone number    SHERLY Lees [facility] Cardiology [phone]        Anticoagulation Patient Findings        Spoke to patient on the phone.   INR  -therapeutic.   Denies signs/symptoms of bleeding and/or thrombosis.   Denies changes to diet or medications.   Follow up appointment in 4 week(s).    Continue weekly warfarin dose as noted      Deepak Nicole, PharmD

## 2018-12-06 ENCOUNTER — TELEPHONE (OUTPATIENT)
Dept: VASCULAR LAB | Facility: MEDICAL CENTER | Age: 83
End: 2018-12-06

## 2018-12-06 LAB — INR PPP: 2.4 (ref 2–3.5)

## 2018-12-07 ENCOUNTER — ANTICOAGULATION MONITORING (OUTPATIENT)
Dept: VASCULAR LAB | Facility: MEDICAL CENTER | Age: 83
End: 2018-12-07

## 2018-12-07 DIAGNOSIS — I26.99 OTHER PULMONARY EMBOLISM WITHOUT ACUTE COR PULMONALE, UNSPECIFIED CHRONICITY (HCC): ICD-10-CM

## 2018-12-08 NOTE — PROGRESS NOTES
Anticoagulation Summary  As of 12/7/2018    INR goal:   2.0-3.0   TTR:   87.6 % (1 y)   Today's INR:   2.4 (12/6/2018)   Warfarin maintenance plan:   1.25 mg (2.5 mg x 0.5) on Thu; 2.5 mg (2.5 mg x 1) all other days   Weekly warfarin total:   16.25 mg   No change documented:   yMnor Capellan, Med Ass't   Plan last modified:   Keenan Griggs, PharmD (6/27/2018)   Next INR check:   1/3/2019   Target end date:   Indefinite    Indications    Pulmonary embolism [415.19] [I26.99]             Anticoagulation Episode Summary     INR check location:       Preferred lab:       Send INR reminders to:       Comments:         Anticoagulation Care Providers     Provider Role Specialty Phone number    SHERLY Lees [facility] Cardiology [phone]        Anticoagulation Patient Findings  Patient Findings     Negatives:   Signs/symptoms of thrombosis, Signs/symptoms of bleeding, Laboratory test error suspected, Change in health, Change in alcohol use, Change in activity, Upcoming invasive procedure, Emergency department visit, Upcoming dental procedure, Missed doses, Extra doses, Change in medications, Change in diet/appetite, Hospital admission, Bruising, Other complaints        Spoke with patient to report a therapeutic INR.  Pt instructed to continue with current warfarin dosing regimen. Pt denies any s/s of bleeding, bruising, clotting or any changes to diet or medication.  Will follow up in 4 weeks.    Mynor Capelaln, Med Ass't      I have reviewed and concur with the above plan     Deepak Nicole, LinaD

## 2018-12-08 NOTE — TELEPHONE ENCOUNTER
Patient states he got labs done at [facility] today. Left message with medical records department to get INR results   Telephone Encounter by Jeanine Rockwell RN at 17 09:06 AM     Author:  Jeanine Rockwell RN Service:  (none) Author Type:  Registered Nurse     Filed:  17 09:13 AM Encounter Date:  3/2/2017 Status:  Signed     :  Jeanine Rockwell RN (Registered Nurse)            POSTPARTUM FOLLOW UP CALL    Date of delivery:[ST1.1T] 17  Lamin[ST1.1M]    Are you breastfeeding?[ST1.1T] Yes - Has patient scheduled her lactation visit yet? No Patient is experiencing the following symptoms: -- No concerns expressed.  Breast and fma feeding.  Problems with latch.  Using nipple shield.  Started pumping.[ST1.1M]      What type of delivery did you have?[ST1.1T]  - Patient is experiencing the following symptoms:-- No concerns expressed.[ST1.1M]    Are you having any pain or discomfort?[ST1.1T] no[ST1.1M]    Are you getting enough support at home?[ST1.1T] YES[ST1.1M]    How much rest are you getting? Sleeping[ST1.1T] at night 4-5[ST1.1M] hours.  Napping during the day    Were you gestational diabetic during your pregnancy?[ST1.1T] NO[ST1.1M]    Have you experienced any symptoms of postpartum depression?   -- Feeling anxious or worried -[ST1.1T] No - patient educated on signs/symptoms of postpartum depression and instructed to call if symptoms continue for more than 2 weeks. Patient denies any feelings of harming herself or her infant.[ST1.1M]   -- Feeling sad -[ST1.1T] No - patient educated on signs/symptoms of postpartum depression and instructed to call if symptoms continue for more than 2 weeks. Patient denies any feelings of harming herself or her infant.[ST1.1M]   -- Feeling isolated -[ST1.1T] No - patient educated on signs/symptoms of postpartum depression and instructed to call if symptoms continue for more than 2 weeks. Patient denies any feelings of harming herself or her infant.[ST1.1M]     Patient was instructed to:   -- avoid sexual intercourse for 6 weeks after delivery.   -- avoid lifting anything  heavier than the baby for at least the first 6 weeks after delivery.  -- call the office if:       vaginal discharge increases or requires changing sanitary pads more than once an hour.       fever or chills.       dizziness.       nausea or vomiting.       shortness of breath.       pain and burning during urination.       painful red breasts.    Have you scheduled your postpartum checkup?[ST1.1T] No - patient transferred to appointment desk to schedule postpartum checkup.[ST1.1M]     Electronically Signed by:    Jeanine Rockwell RN , 3/7/2017[ST1.1T]           Revision History        User Key Date/Time User Provider Type Action    > ST1.1 03/07/17 09:13 AM Jeanine Rockwell, RN Registered Nurse Sign    M - Manual, T - Template

## 2019-01-02 ENCOUNTER — ANTICOAGULATION MONITORING (OUTPATIENT)
Dept: VASCULAR LAB | Facility: MEDICAL CENTER | Age: 84
End: 2019-01-02

## 2019-01-02 DIAGNOSIS — I26.99 OTHER PULMONARY EMBOLISM WITHOUT ACUTE COR PULMONALE, UNSPECIFIED CHRONICITY (HCC): ICD-10-CM

## 2019-01-02 LAB — INR PPP: 2.5 (ref 2–3.5)

## 2019-01-02 NOTE — PROGRESS NOTES
Anticoagulation Summary  As of 1/2/2019    INR goal:   2.0-3.0   TTR:   88.4 % (1.1 y)   Today's INR:   2.5   Warfarin maintenance plan:   1.25 mg (2.5 mg x 0.5) on Thu; 2.5 mg (2.5 mg x 1) all other days   Weekly warfarin total:   16.25 mg   No change documented:   Chapin Garrido Ass't   Plan last modified:   Keenan Griggs, PharmD (6/27/2018)   Next INR check:   1/30/2019   Target end date:   Indefinite    Indications    Pulmonary embolism [415.19] [I26.99]             Anticoagulation Episode Summary     INR check location:       Preferred lab:       Send INR reminders to:       Comments:         Anticoagulation Care Providers     Provider Role Specialty Phone number    SHERLY Lees [facility] Cardiology [phone]        Anticoagulation Patient Findings  Patient Findings     Negatives:   Signs/symptoms of thrombosis, Signs/symptoms of bleeding, Laboratory test error suspected, Change in health, Change in alcohol use, Change in activity, Upcoming invasive procedure, Emergency department visit, Upcoming dental procedure, Missed doses, Extra doses, Change in medications, Change in diet/appetite, Hospital admission, Bruising, Other complaints      Spoke with patient to report a therapeutic INR.  Pt instructed to continue with current warfarin dosing regimen. Pt denies any s/s of bleeding, bruising, clotting or any changes to diet or medication.  Will follow up in 4 weeks.  Chapin Garrido Ass't    I have reviewed and am in agreement with the above stated plan on 1-2-19.  Keenan Griggs, LinaD

## 2019-01-30 ENCOUNTER — ANTICOAGULATION MONITORING (OUTPATIENT)
Dept: VASCULAR LAB | Facility: MEDICAL CENTER | Age: 84
End: 2019-01-30

## 2019-01-30 LAB — INR PPP: 2 (ref 2–3.5)

## 2019-01-30 NOTE — PROGRESS NOTES
Anticoagulation Summary  As of 2019    INR goal:   2.0-3.0   TTR:   89.1 % (1.2 y)   INR used for dosin.0 (2019)   Warfarin maintenance plan:   1.25 mg (2.5 mg x 0.5) every Thu; 2.5 mg (2.5 mg x 1) all other days   Weekly warfarin total:   16.25 mg   Plan last modified:   Keenan Griggs, PharmD (2018)   Next INR check:   2019   Target end date:   Indefinite    Indications    Pulmonary embolism [415.19] [I26.99]             Anticoagulation Episode Summary     INR check location:       Preferred lab:       Send INR reminders to:       Comments:         Anticoagulation Care Providers     Provider Role Specialty Phone number    SHERLY Lees [facility] Cardiology [phone]        Anticoagulation Patient Findings      Spoke with patient's wife to report a therapeutic INR.    Pt instructed to continue with current warfarin dosing regimen, confirms dosing.   Pt denies any s/s of bleeding, bruising, clotting or any changes to diet or medication.    Will follow up in 4 week(s).     Mateo Elizabeth, Pharmacy Intern

## 2019-02-02 ENCOUNTER — APPOINTMENT (OUTPATIENT)
Dept: RADIOLOGY | Facility: MEDICAL CENTER | Age: 84
DRG: 871 | End: 2019-02-02
Attending: EMERGENCY MEDICINE
Payer: MEDICARE

## 2019-02-02 ENCOUNTER — APPOINTMENT (OUTPATIENT)
Dept: RADIOLOGY | Facility: MEDICAL CENTER | Age: 84
DRG: 871 | End: 2019-02-02
Payer: MEDICARE

## 2019-02-02 ENCOUNTER — HOSPITAL ENCOUNTER (INPATIENT)
Facility: MEDICAL CENTER | Age: 84
LOS: 4 days | DRG: 871 | End: 2019-02-06
Attending: EMERGENCY MEDICINE | Admitting: INTERNAL MEDICINE
Payer: MEDICARE

## 2019-02-02 DIAGNOSIS — R53.81 DEBILITY: ICD-10-CM

## 2019-02-02 DIAGNOSIS — A41.9 SEPSIS, DUE TO UNSPECIFIED ORGANISM: ICD-10-CM

## 2019-02-02 DIAGNOSIS — Z86.711 HISTORY OF PULMONARY EMBOLISM: ICD-10-CM

## 2019-02-02 DIAGNOSIS — J18.9 PNEUMONIA DUE TO INFECTIOUS ORGANISM, UNSPECIFIED LATERALITY, UNSPECIFIED PART OF LUNG: ICD-10-CM

## 2019-02-02 DIAGNOSIS — R41.82 ALTERED MENTAL STATUS, UNSPECIFIED ALTERED MENTAL STATUS TYPE: ICD-10-CM

## 2019-02-02 DIAGNOSIS — J96.01 ACUTE RESPIRATORY FAILURE WITH HYPOXIA (HCC): ICD-10-CM

## 2019-02-02 PROBLEM — G92.8 TOXIC METABOLIC ENCEPHALOPATHY: Status: ACTIVE | Noted: 2019-02-02

## 2019-02-02 PROBLEM — R21 RASH: Status: ACTIVE | Noted: 2019-02-02

## 2019-02-02 PROBLEM — Z71.89 DNR (DO NOT RESUSCITATE) DISCUSSION: Status: ACTIVE | Noted: 2019-02-02

## 2019-02-02 PROBLEM — Z79.01 CHRONIC ANTICOAGULATION: Status: ACTIVE | Noted: 2019-02-02

## 2019-02-02 LAB
ALBUMIN SERPL BCP-MCNC: 4.1 G/DL (ref 3.2–4.9)
ALBUMIN/GLOB SERPL: 1.6 G/DL
ALP SERPL-CCNC: 52 U/L (ref 30–99)
ALT SERPL-CCNC: 11 U/L (ref 2–50)
ANION GAP SERPL CALC-SCNC: 9 MMOL/L (ref 0–11.9)
APPEARANCE UR: CLEAR
APTT PPP: 32.2 SEC (ref 24.7–36)
AST SERPL-CCNC: 20 U/L (ref 12–45)
BACTERIA #/AREA URNS HPF: NEGATIVE /HPF
BASOPHILS # BLD AUTO: 0.5 % (ref 0–1.8)
BASOPHILS # BLD: 0.06 K/UL (ref 0–0.12)
BILIRUB SERPL-MCNC: 0.6 MG/DL (ref 0.1–1.5)
BILIRUB UR QL STRIP.AUTO: NEGATIVE
BUN SERPL-MCNC: 14 MG/DL (ref 8–22)
CALCIUM SERPL-MCNC: 8.9 MG/DL (ref 8.5–10.5)
CHLORIDE SERPL-SCNC: 102 MMOL/L (ref 96–112)
CO2 SERPL-SCNC: 23 MMOL/L (ref 20–33)
COLOR UR: YELLOW
CREAT SERPL-MCNC: 0.91 MG/DL (ref 0.5–1.4)
EKG IMPRESSION: NORMAL
EKG IMPRESSION: NORMAL
EOSINOPHIL # BLD AUTO: 0.04 K/UL (ref 0–0.51)
EOSINOPHIL NFR BLD: 0.3 % (ref 0–6.9)
EPI CELLS #/AREA URNS HPF: NEGATIVE /HPF
ERYTHROCYTE [DISTWIDTH] IN BLOOD BY AUTOMATED COUNT: 50.5 FL (ref 35.9–50)
FLUAV RNA SPEC QL NAA+PROBE: NEGATIVE
FLUBV RNA SPEC QL NAA+PROBE: NEGATIVE
GLOBULIN SER CALC-MCNC: 2.5 G/DL (ref 1.9–3.5)
GLUCOSE SERPL-MCNC: 111 MG/DL (ref 65–99)
GLUCOSE UR STRIP.AUTO-MCNC: NEGATIVE MG/DL
HCT VFR BLD AUTO: 39.5 % (ref 42–52)
HGB BLD-MCNC: 14.1 G/DL (ref 14–18)
HYALINE CASTS #/AREA URNS LPF: ABNORMAL /LPF
IMM GRANULOCYTES # BLD AUTO: 0.07 K/UL (ref 0–0.11)
IMM GRANULOCYTES NFR BLD AUTO: 0.6 % (ref 0–0.9)
INR PPP: 2.3 (ref 0.87–1.13)
KETONES UR STRIP.AUTO-MCNC: NEGATIVE MG/DL
LACTATE BLD-SCNC: 1.7 MMOL/L (ref 0.5–2)
LACTATE BLD-SCNC: 2.1 MMOL/L (ref 0.5–2)
LACTATE BLD-SCNC: 2.8 MMOL/L (ref 0.5–2)
LEUKOCYTE ESTERASE UR QL STRIP.AUTO: NEGATIVE
LYMPHOCYTES # BLD AUTO: 1.15 K/UL (ref 1–4.8)
LYMPHOCYTES NFR BLD: 9.7 % (ref 22–41)
MCH RBC QN AUTO: 37.5 PG (ref 27–33)
MCHC RBC AUTO-ENTMCNC: 35.7 G/DL (ref 33.7–35.3)
MCV RBC AUTO: 105.1 FL (ref 81.4–97.8)
MICRO URNS: ABNORMAL
MONOCYTES # BLD AUTO: 0.54 K/UL (ref 0–0.85)
MONOCYTES NFR BLD AUTO: 4.5 % (ref 0–13.4)
NEUTROPHILS # BLD AUTO: 10.02 K/UL (ref 1.82–7.42)
NEUTROPHILS NFR BLD: 84.4 % (ref 44–72)
NITRITE UR QL STRIP.AUTO: NEGATIVE
NRBC # BLD AUTO: 0 K/UL
NRBC BLD-RTO: 0 /100 WBC
PH UR STRIP.AUTO: 7.5 [PH]
PLATELET # BLD AUTO: 189 K/UL (ref 164–446)
PMV BLD AUTO: 10.1 FL (ref 9–12.9)
POTASSIUM SERPL-SCNC: 4.4 MMOL/L (ref 3.6–5.5)
PROT SERPL-MCNC: 6.6 G/DL (ref 6–8.2)
PROT UR QL STRIP: 100 MG/DL
PROTHROMBIN TIME: 25.4 SEC (ref 12–14.6)
RBC # BLD AUTO: 3.76 M/UL (ref 4.7–6.1)
RBC # URNS HPF: ABNORMAL /HPF
RBC UR QL AUTO: ABNORMAL
SODIUM SERPL-SCNC: 134 MMOL/L (ref 135–145)
SP GR UR STRIP.AUTO: 1.01
UROBILINOGEN UR STRIP.AUTO-MCNC: 0.2 MG/DL
WBC # BLD AUTO: 11.9 K/UL (ref 4.8–10.8)
WBC #/AREA URNS HPF: ABNORMAL /HPF

## 2019-02-02 PROCEDURE — 93005 ELECTROCARDIOGRAM TRACING: CPT

## 2019-02-02 PROCEDURE — 36415 COLL VENOUS BLD VENIPUNCTURE: CPT

## 2019-02-02 PROCEDURE — 87086 URINE CULTURE/COLONY COUNT: CPT

## 2019-02-02 PROCEDURE — 81001 URINALYSIS AUTO W/SCOPE: CPT

## 2019-02-02 PROCEDURE — 85025 COMPLETE CBC W/AUTO DIFF WBC: CPT

## 2019-02-02 PROCEDURE — 80053 COMPREHEN METABOLIC PANEL: CPT

## 2019-02-02 PROCEDURE — 90471 IMMUNIZATION ADMIN: CPT

## 2019-02-02 PROCEDURE — 93005 ELECTROCARDIOGRAM TRACING: CPT | Performed by: EMERGENCY MEDICINE

## 2019-02-02 PROCEDURE — 85730 THROMBOPLASTIN TIME PARTIAL: CPT

## 2019-02-02 PROCEDURE — 71045 X-RAY EXAM CHEST 1 VIEW: CPT

## 2019-02-02 PROCEDURE — 93010 ELECTROCARDIOGRAM REPORT: CPT | Performed by: INTERNAL MEDICINE

## 2019-02-02 PROCEDURE — 99221 1ST HOSP IP/OBS SF/LOW 40: CPT | Performed by: INTERNAL MEDICINE

## 2019-02-02 PROCEDURE — 96365 THER/PROPH/DIAG IV INF INIT: CPT

## 2019-02-02 PROCEDURE — 700111 HCHG RX REV CODE 636 W/ 250 OVERRIDE (IP): Performed by: INTERNAL MEDICINE

## 2019-02-02 PROCEDURE — A9270 NON-COVERED ITEM OR SERVICE: HCPCS

## 2019-02-02 PROCEDURE — 700111 HCHG RX REV CODE 636 W/ 250 OVERRIDE (IP): Performed by: EMERGENCY MEDICINE

## 2019-02-02 PROCEDURE — 700102 HCHG RX REV CODE 250 W/ 637 OVERRIDE(OP): Performed by: INTERNAL MEDICINE

## 2019-02-02 PROCEDURE — 83605 ASSAY OF LACTIC ACID: CPT

## 2019-02-02 PROCEDURE — A9270 NON-COVERED ITEM OR SERVICE: HCPCS | Performed by: INTERNAL MEDICINE

## 2019-02-02 PROCEDURE — 85610 PROTHROMBIN TIME: CPT

## 2019-02-02 PROCEDURE — 700105 HCHG RX REV CODE 258: Performed by: INTERNAL MEDICINE

## 2019-02-02 PROCEDURE — 700102 HCHG RX REV CODE 250 W/ 637 OVERRIDE(OP)

## 2019-02-02 PROCEDURE — 99358 PROLONG SERVICE W/O CONTACT: CPT | Performed by: INTERNAL MEDICINE

## 2019-02-02 PROCEDURE — 90662 IIV NO PRSV INCREASED AG IM: CPT | Performed by: INTERNAL MEDICINE

## 2019-02-02 PROCEDURE — 3E0234Z INTRODUCTION OF SERUM, TOXOID AND VACCINE INTO MUSCLE, PERCUTANEOUS APPROACH: ICD-10-PCS | Performed by: HOSPITALIST

## 2019-02-02 PROCEDURE — 770020 HCHG ROOM/CARE - TELE (206)

## 2019-02-02 PROCEDURE — 99285 EMERGENCY DEPT VISIT HI MDM: CPT

## 2019-02-02 PROCEDURE — 3E02340 INTRODUCTION OF INFLUENZA VACCINE INTO MUSCLE, PERCUTANEOUS APPROACH: ICD-10-PCS | Performed by: HOSPITALIST

## 2019-02-02 PROCEDURE — 90670 PCV13 VACCINE IM: CPT | Performed by: INTERNAL MEDICINE

## 2019-02-02 PROCEDURE — 87040 BLOOD CULTURE FOR BACTERIA: CPT | Mod: 91

## 2019-02-02 PROCEDURE — 93005 ELECTROCARDIOGRAM TRACING: CPT | Performed by: INTERNAL MEDICINE

## 2019-02-02 PROCEDURE — 700105 HCHG RX REV CODE 258: Performed by: EMERGENCY MEDICINE

## 2019-02-02 PROCEDURE — 70450 CT HEAD/BRAIN W/O DYE: CPT

## 2019-02-02 PROCEDURE — 87502 INFLUENZA DNA AMP PROBE: CPT

## 2019-02-02 PROCEDURE — 96375 TX/PRO/DX INJ NEW DRUG ADDON: CPT

## 2019-02-02 RX ORDER — LORAZEPAM 2 MG/ML
1 INJECTION INTRAMUSCULAR EVERY 4 HOURS PRN
Status: DISCONTINUED | OUTPATIENT
Start: 2019-02-02 | End: 2019-02-06

## 2019-02-02 RX ORDER — SODIUM CHLORIDE, SODIUM LACTATE, POTASSIUM CHLORIDE, CALCIUM CHLORIDE 600; 310; 30; 20 MG/100ML; MG/100ML; MG/100ML; MG/100ML
1000 INJECTION, SOLUTION INTRAVENOUS ONCE
Status: COMPLETED | OUTPATIENT
Start: 2019-02-02 | End: 2019-02-02

## 2019-02-02 RX ORDER — VALACYCLOVIR HYDROCHLORIDE 500 MG/1
500 TABLET, FILM COATED ORAL 2 TIMES DAILY
Status: DISCONTINUED | OUTPATIENT
Start: 2019-02-02 | End: 2019-02-02

## 2019-02-02 RX ORDER — ACETAMINOPHEN 325 MG/1
650 TABLET ORAL ONCE
Status: DISCONTINUED | OUTPATIENT
Start: 2019-02-02 | End: 2019-02-02

## 2019-02-02 RX ORDER — ACETAMINOPHEN 650 MG/1
650 SUPPOSITORY RECTAL ONCE
Status: COMPLETED | OUTPATIENT
Start: 2019-02-02 | End: 2019-02-02

## 2019-02-02 RX ORDER — ACETAMINOPHEN 325 MG/1
650 TABLET ORAL EVERY 6 HOURS PRN
Status: DISCONTINUED | OUTPATIENT
Start: 2019-02-02 | End: 2019-02-06 | Stop reason: HOSPADM

## 2019-02-02 RX ORDER — WARFARIN SODIUM 2.5 MG/1
1.25-2.5 TABLET ORAL DAILY
COMMUNITY
End: 2020-07-28 | Stop reason: SDUPTHER

## 2019-02-02 RX ORDER — AMOXICILLIN 250 MG
2 CAPSULE ORAL 2 TIMES DAILY
Status: DISCONTINUED | OUTPATIENT
Start: 2019-02-02 | End: 2019-02-06 | Stop reason: HOSPADM

## 2019-02-02 RX ORDER — DUTASTERIDE 0.5 MG/1
0.5 CAPSULE, LIQUID FILLED ORAL DAILY
COMMUNITY

## 2019-02-02 RX ORDER — WARFARIN SODIUM 2.5 MG/1
1.25 TABLET ORAL
Status: DISCONTINUED | OUTPATIENT
Start: 2019-02-07 | End: 2019-02-05

## 2019-02-02 RX ORDER — WARFARIN SODIUM 2.5 MG/1
2.5 TABLET ORAL
Status: DISCONTINUED | OUTPATIENT
Start: 2019-02-02 | End: 2019-02-05

## 2019-02-02 RX ORDER — SODIUM CHLORIDE 9 MG/ML
30 INJECTION, SOLUTION INTRAVENOUS
Status: DISCONTINUED | OUTPATIENT
Start: 2019-02-02 | End: 2019-02-06

## 2019-02-02 RX ORDER — DIPHENHYDRAMINE HCL 25 MG
12.5 TABLET ORAL EVERY 8 HOURS PRN
Status: DISCONTINUED | OUTPATIENT
Start: 2019-02-02 | End: 2019-02-06 | Stop reason: HOSPADM

## 2019-02-02 RX ORDER — BISACODYL 10 MG
10 SUPPOSITORY, RECTAL RECTAL
Status: DISCONTINUED | OUTPATIENT
Start: 2019-02-02 | End: 2019-02-06 | Stop reason: HOSPADM

## 2019-02-02 RX ORDER — LORAZEPAM 2 MG/ML
0.5 INJECTION INTRAMUSCULAR EVERY 4 HOURS PRN
Status: DISCONTINUED | OUTPATIENT
Start: 2019-02-02 | End: 2019-02-02

## 2019-02-02 RX ORDER — LACTOBACILLUS RHAMNOSUS GG 10B CELL
1 CAPSULE ORAL
Status: DISCONTINUED | OUTPATIENT
Start: 2019-02-03 | End: 2019-02-06 | Stop reason: HOSPADM

## 2019-02-02 RX ORDER — POLYETHYLENE GLYCOL 3350 17 G/17G
1 POWDER, FOR SOLUTION ORAL
Status: DISCONTINUED | OUTPATIENT
Start: 2019-02-02 | End: 2019-02-06 | Stop reason: HOSPADM

## 2019-02-02 RX ORDER — AZITHROMYCIN 500 MG/1
500 INJECTION, POWDER, LYOPHILIZED, FOR SOLUTION INTRAVENOUS ONCE
Status: COMPLETED | OUTPATIENT
Start: 2019-02-02 | End: 2019-02-02

## 2019-02-02 RX ORDER — SODIUM CHLORIDE 9 MG/ML
500 INJECTION, SOLUTION INTRAVENOUS
Status: DISCONTINUED | OUTPATIENT
Start: 2019-02-02 | End: 2019-02-06

## 2019-02-02 RX ORDER — WARFARIN SODIUM 2.5 MG/1
1.25-2.5 TABLET ORAL DAILY
Status: DISCONTINUED | OUTPATIENT
Start: 2019-02-02 | End: 2019-02-02

## 2019-02-02 RX ADMIN — ACYCLOVIR SODIUM 680 MG: 500 INJECTION, SOLUTION INTRAVENOUS at 14:31

## 2019-02-02 RX ADMIN — AMPICILLIN SODIUM 2000 MG: 2 INJECTION, POWDER, FOR SOLUTION INTRAMUSCULAR; INTRAVENOUS at 18:01

## 2019-02-02 RX ADMIN — LORAZEPAM 0.5 MG: 2 INJECTION INTRAMUSCULAR; INTRAVENOUS at 09:18

## 2019-02-02 RX ADMIN — VANCOMYCIN HYDROCHLORIDE 1900 MG: 100 INJECTION, POWDER, LYOPHILIZED, FOR SOLUTION INTRAVENOUS at 14:15

## 2019-02-02 RX ADMIN — INFLUENZA A VIRUS A/MICHIGAN/45/2015 X-275 (H1N1) ANTIGEN (FORMALDEHYDE INACTIVATED), INFLUENZA A VIRUS A/SINGAPORE/INFIMH-16-0019/2016 IVR-186 (H3N2) ANTIGEN (FORMALDEHYDE INACTIVATED), AND INFLUENZA B VIRUS B/MARYLAND/15/2016 BX-69A (A B/COLORADO/6/2017-LIKE VIRUS) ANTIGEN (FORMALDEHYDE INACTIVATED) 0.5 ML: 60; 60; 60 INJECTION, SUSPENSION INTRAMUSCULAR at 18:02

## 2019-02-02 RX ADMIN — AMPICILLIN SODIUM 2000 MG: 2 INJECTION, POWDER, FOR SOLUTION INTRAMUSCULAR; INTRAVENOUS at 23:49

## 2019-02-02 RX ADMIN — ACETAMINOPHEN 650 MG: 650 SUPPOSITORY RECTAL at 07:30

## 2019-02-02 RX ADMIN — SODIUM CHLORIDE, POTASSIUM CHLORIDE, SODIUM LACTATE AND CALCIUM CHLORIDE 1000 ML: 600; 310; 30; 20 INJECTION, SOLUTION INTRAVENOUS at 07:00

## 2019-02-02 RX ADMIN — PNEUMOCOCCAL 13-VALENT CONJUGATE VACCINE 0.5 ML: 2.2; 2.2; 2.2; 2.2; 2.2; 4.4; 2.2; 2.2; 2.2; 2.2; 2.2; 2.2; 2.2 INJECTION, SUSPENSION INTRAMUSCULAR at 18:01

## 2019-02-02 RX ADMIN — CEFTRIAXONE SODIUM 2 G: 2 INJECTION, POWDER, FOR SOLUTION INTRAMUSCULAR; INTRAVENOUS at 18:00

## 2019-02-02 RX ADMIN — CEFTRIAXONE SODIUM 2 G: 2 INJECTION, POWDER, FOR SOLUTION INTRAMUSCULAR; INTRAVENOUS at 07:00

## 2019-02-02 RX ADMIN — AMPICILLIN SODIUM 2000 MG: 2 INJECTION, POWDER, FOR SOLUTION INTRAMUSCULAR; INTRAVENOUS at 14:24

## 2019-02-02 RX ADMIN — LORAZEPAM 1 MG: 2 INJECTION INTRAMUSCULAR; INTRAVENOUS at 11:50

## 2019-02-02 RX ADMIN — AZITHROMYCIN MONOHYDRATE 500 MG: 500 INJECTION, POWDER, LYOPHILIZED, FOR SOLUTION INTRAVENOUS at 08:36

## 2019-02-02 RX ADMIN — WARFARIN SODIUM 2.5 MG: 2.5 TABLET ORAL at 20:31

## 2019-02-02 ASSESSMENT — COGNITIVE AND FUNCTIONAL STATUS - GENERAL
SUGGESTED CMS G CODE MODIFIER DAILY ACTIVITY: CJ
STANDING UP FROM CHAIR USING ARMS: A LITTLE
CLIMB 3 TO 5 STEPS WITH RAILING: A LITTLE
MOBILITY SCORE: 21
SUGGESTED CMS G CODE MODIFIER MOBILITY: CJ
HELP NEEDED FOR BATHING: A LITTLE
DAILY ACTIVITIY SCORE: 22
WALKING IN HOSPITAL ROOM: A LITTLE
TOILETING: A LITTLE

## 2019-02-02 ASSESSMENT — ENCOUNTER SYMPTOMS
WEAKNESS: 1
CARDIOVASCULAR NEGATIVE: 1
MUSCULOSKELETAL NEGATIVE: 1
CHILLS: 1
MEMORY LOSS: 1
FEVER: 1
GASTROINTESTINAL NEGATIVE: 1
EYES NEGATIVE: 1
RESPIRATORY NEGATIVE: 1

## 2019-02-02 ASSESSMENT — LIFESTYLE VARIABLES
EVER_SMOKED: NEVER
DO YOU DRINK ALCOHOL: NO

## 2019-02-02 ASSESSMENT — PATIENT HEALTH QUESTIONNAIRE - PHQ9
1. LITTLE INTEREST OR PLEASURE IN DOING THINGS: NOT AT ALL
2. FEELING DOWN, DEPRESSED, IRRITABLE, OR HOPELESS: NOT AT ALL
SUM OF ALL RESPONSES TO PHQ9 QUESTIONS 1 AND 2: 0

## 2019-02-02 NOTE — PROGRESS NOTES
"Pharmacy Kinetics 89 y.o. male on vancomycin day # 1 2019    Currently on Vancomycin 1900 mg iv once LD    Indication for Treatment: Possible CNS Infection    Pertinent history per medical record: Admitted on 2019 for ALOC. Per notes, patient has encephalopathy and is agitated. Patient has refused lumbar puncture prior to this admission to evaluate for meningitis and per provider, will not perform another lumbar puncture at this time. Empiric antibiotics have been started for CNS coverage.    Other antibiotics: Ampicillin 2 g IV q6h and acyclovir 680 mg q12h    Allergies: Patient has no known allergies.     List concerns for renal function: age, concomitant acyclovir     Pertinent cultures to date:     NGTD in any previous cultures, blood cultures have been ordered    MRSA nares swab if pneumonia is a concern (ordered/positive/negative/n-a): n/a    Recent Labs      19   0557   WBC  11.9*   NEUTSPOLYS  84.40*     Recent Labs      19   0557   BUN  14   CREATININE  0.91   ALBUMIN  4.1     No results for input(s): VANCOTROUGH, VANCOPEAK, VANCORANDOM in the last 72 hours.  Intake/Output Summary (Last 24 hours) at 19 1500  Last data filed at 19 0905   Gross per 24 hour   Intake             1100 ml   Output                0 ml   Net             1100 ml      Blood pressure 151/80, pulse 91, temperature 37.8 °C (100 °F), temperature source Temporal, resp. rate 20, height 1.727 m (5' 8\"), weight 74.9 kg (165 lb 2 oz), SpO2 95 %. Temp (24hrs), Av.9 °C (100.2 °F), Min:37.7 °C (99.9 °F), Max:38.3 °C (100.9 °F)      A/P   1. Vancomycin dose change: not indicated at this time  2. Next vancomycin level: 2/3/19 @ 1300 (~23 hours post LD)  3. Goal trough: 18-22 mcg/mL  4. Comments: Patient's renal function is stable since previous admission. Will obtain a random level with AM labs to evaluate patient's clearance of vancomycin. Will recommend ID consult at this time to help with appropriate " antibiotic selection. Pharmacy will continue to monitor.    Florina London, PharmD

## 2019-02-02 NOTE — H&P
Hospital Medicine History & Physical Note    Date of Service  2/2/2019    Primary Care Physician  Pcp Not In Computer    Consultants   None    Code Status  Full    Chief Complaint  Confusion    History of Presenting Illness  89 y.o. male who presented 2/2/2019 with mental status change, urinary incontinence, fever, abnormal chest x-ray.  The patient had been doing well yesterday, usually walks about a mile around his neighborhood at home daily.  He lives with his wife, she notes he felt fine yesterday.  In the middle of the night, she woke up to hear him walking around the house, noted that he had urinated on the floor in the bathroom and was very confused.  He normally gets up at night to urinate, but this morning he was not himself, with shaking, moaning and incoherent.      He usually refuses a flu shot, has not received one in years, but has received a pneumonia shot.  He not been complaining of a cough, shortness of breath recently or other respiratory complaints, has not had illness contacts.  His wife noted the patient had an abrasion on his left temple, is on Coumadin, but CT scan here did not show evidence of intracranial bleed.  She thinks he may have bumped his head on the home sofa, where he was when she found him.  This morning, when she found the patient, he did not recognize her, was not speaking sensibly, and given his confusion she knew something was wrong.    In the emergency room, pt was found to be hypotensive, febrile, with possible infiltrates on chest x-ray.  Did not appear to have urinary tract infection.  Lactate level was elevated, blood cultures have been sent, patient's white blood cell count elevated.  He was started on IV antibiotics, IV fluids for sepsis likely due to pneumonia.    Review of Systems  Review of Systems   Constitutional: Positive for chills, fever and malaise/fatigue.   HENT: Negative.    Eyes: Negative.    Respiratory: Negative.    Cardiovascular: Negative.     Gastrointestinal: Negative.    Genitourinary: Positive for frequency.   Musculoskeletal: Negative.    Skin: Negative.    Neurological: Positive for weakness.   Psychiatric/Behavioral: Positive for memory loss.   All other systems reviewed and are negative.      Past Medical History   has a past medical history of Pulmonary embolism (HCC).   On chronic Coumadin    Surgical History   has no past surgical history on file.     Family History  family history is not on file.     Social History   reports that he has never smoked. He does not have any smokeless tobacco history on file. He reports that he does not drink alcohol or use drugs.   Worked as a  for the [employer] for many years.  Lives in [place] with his wife for the last 30+ years.  Walks daily in his neighborhood, quite functional according to his wife.    Allergies  No Known Allergies    Medications  Prior to Admission Medications   Prescriptions Last Dose Informant Patient Reported? Taking?   dutasteride (AVODART) 0.5 MG capsule 2/1/2019 at 1200 Significant Other Yes Yes   Sig: Take 0.5 mg by mouth every day.   triazolam (HALCION) 0.25 MG Tab 2/1/2019 at hs Significant Other Yes Yes   Sig: Take 0.5 mg by mouth at bedtime as needed.   warfarin (COUMADIN) 2.5 MG Tab 2/1/2019 at 1630 Significant Other Yes Yes   Sig: Take 1.25-2.5 mg by mouth every day. 2.5mg every day except 1.25mg on Thursdays      Facility-Administered Medications: None       Physical Exam  Temp:  [38.3 °C (100.9 °F)] 38.3 °C (100.9 °F)  Pulse:  [91-95] 91  Resp:  [18-20] 20  SpO2:  [97 %] 97 %    Physical Exam   Constitutional: He appears well-developed and well-nourished. No distress.   HENT:   Abrasion left temple   Eyes: Pupils are equal, round, and reactive to light. Conjunctivae and EOM are normal.   Neck: Normal range of motion. Neck supple.   Cardiovascular: Normal rate, regular rhythm, normal heart sounds and intact distal pulses.    Feet cool  bilaterally   Pulmonary/Chest: Effort normal and breath sounds normal. No respiratory distress. He has no wheezes. He has no rales.   Not cooperating with full inspiratory effort   Abdominal: Soft. Bowel sounds are normal. He exhibits no distension and no mass. There is no tenderness. There is no rebound and no guarding.   Musculoskeletal: Normal range of motion.   Neurological: He is alert. No cranial nerve deficit.   Not oriented.  Answers no to most questions.  Not clear patient understanding questions.  Does not recognize his wife.   Skin: Skin is warm and dry. He is not diaphoretic.   Psychiatric: He has a normal mood and affect.       Laboratory:  Recent Labs      02/02/19   0557   WBC  11.9*   RBC  3.76*   HEMOGLOBIN  14.1   HEMATOCRIT  39.5*   MCV  105.1*   MCH  37.5*   MCHC  35.7*   RDW  50.5*   PLATELETCT  189   MPV  10.1     Recent Labs      02/02/19   0557   SODIUM  134*   POTASSIUM  4.4   CHLORIDE  102   CO2  23   GLUCOSE  111*   BUN  14   CREATININE  0.91   CALCIUM  8.9     Recent Labs      02/02/19   0557   ALTSGPT  11   ASTSGOT  20   ALKPHOSPHAT  52   TBILIRUBIN  0.6   GLUCOSE  111*                 No results for input(s): TROPONINI in the last 72 hours.    Urinalysis:    Recent Labs      02/02/19   0705   SPECGRAVITY  1.012   GLUCOSEUR  Negative   KETONES  Negative   NITRITE  Negative   LEUKESTERAS  Negative   WBCURINE  2-5*   RBCURINE  20-50*   BACTERIA  Negative   EPITHELCELL  Negative        Imaging:  Chest x-ray 2/2/19: Mild pulmonary edema/and or infiltrates, cardiomegaly  Head CT without contrast 2/2/19: No acute intracranial abnormality.  Nonspecific white matter changes, mild cerebral atrophy        Assessment/Plan:  I anticipate this patient requires inpatient stay for sepsis.    1.  Sepsis.  Patient with hypotension, fever, mental status change, abnormal chest x-ray on admission, elevated lactate.  IV fluids, IV antibiotics for community-acquired pneumonia, supportive care.  No evidence of  UTI by lab testing thus far.  Blood cultures pending.  Patient responding well to IV fluids, IV antibiotics, does not need pressors at this time.    2.  Delirium.  Suspect related to sepsis.  According to the patient's wife, he meditates normally at baseline.  Head CT negative, but may need to reimage if symptoms persist, given patient seems to have had a fall and is on Coumadin.    3.  Pneumonia.  Diffuse infiltrates by chest x-ray, see #1 above as well.    4.  S/p Fall.  Likely related to #1,2.  CT head negative.  Normally ambulates well.  PT eval prior to d/c.    5.  Chronic anticoagulation.  The patient has a history of pulmonary emboli, on chronic anticoagulation with recent well-controlled INR.  Given the patient has not been complaining of chest pain, is not markedly hypoxic, did not suspect PEs as part of current acute picture but could consider further imaging if respiratory status worsens.    6.  Urinary incontinence.  New for the patient, although he does get up often at night to urinate according to his wife, is on dutasteride.  No evidence of UTI as stated above by UA on this admission.  New symptoms likely related to #1, #2 above.    7.  Chronic insomnia.  On Halcion most nights without recent change to suggest etiology of delirium.    8.  F/E/N.  IV fluids until adequate p.o.  Regular diet if tolerated.  Watch for aspiration of too confused to eat with assistance.  Electrolytes currently normal, will follow during the admission.    9.  Anticipate discharge to home when medically stable.    VTE prophylaxis: Patient already on Coumadin with therapeutic INR recently, today's INR value pending.

## 2019-02-02 NOTE — ED TRIAGE NOTES
Chief Complaint   Patient presents with   • ALOC     Pt was normal last night, but found this morning naked and shivering.  He is also very confused.  Speaking clear words, but not making sense with his sentences.  Temp = 38.3, RA saturation = 89% per EMS driver.     BIBA to room.  Agree with triage assessment.  PT is very tremulous and confused to situation.  Sepsis protocol started due to fever, tachypnea, and confusion.   Changing into gown.  Chart placed for ERP eval.

## 2019-02-02 NOTE — PROGRESS NOTES
· 2 RN skin check complete with Mirna CARDOSO RN.    Pt has redness on left ear that blanches  Pt has scabbed abrasion on left temple.  Pt has scattered bruising on left elbow and right forearm.   Rash on central abdomen and scattered on back.   Pt has scab to both shins.  Pt has redness to left pinky toe that blanches.  Pt has right hip redness/ thin abrasion.  Pt has left buttock redness that blanches.

## 2019-02-02 NOTE — ASSESSMENT & PLAN NOTE
This is severe sepsis with the following associated acute organ dysfunction(s): metabolic/septic encephalopathy.   2/2 acute PNA  Lactic acidosis resolved with IVF, Normotensive now with IVF.  bcx neg   Resp and O2 per protocol   Echo Hyperdynamic left ventricular systolic function.  Left ventricular   ejection fraction is visually estimated to be greater than 75%.

## 2019-02-02 NOTE — PROGRESS NOTES
"I saw Mr. Louise. His wife is at bedside.  He is 89 years old and lives with wife at home. Of baseline mentation and usually walks about a mile for exercise.  He has not had flu or shingles shots.  I reviewed the chart. 5 years ago he was admitted for a similar picture. It was thought he may have meningitis/encephalitis but he refused LP. It was said he said \"everyone dies of something\". He was instead put empirically on Valtrex PO for a 10d course as per recommendations from ID. He left AMA at that time.  He is also on chronic anticoagulation indefinitely for pulmonary embolism.    He presented with fever, confusion, urinary incontinence this morning.  At the ED, he is hypotensive but fluid responsive, febrile. His blood pressure has normalized with boluses of fluid but can get hypotensive. CT head no acute intracranial abnormality. CXR showed possible edema or infiltrates. EKG wasn;t a good study in my interpretation. Mild leukocytosis. U/A negative for UTI.  He was started on IV antibiotics. He was admitted to the surgical floor.  When I saw him at the surgical floor, he was confused. His wife at bedside. I was asked by the nurse to look at a diffuse rash. They were concerned about shingles.  On exam, he has a rash but they are not vesicular, not blanching. He is confused but he does not wince or show signs of pain when this rash is palpated. It does not appear to be dermatomal either. Moving all extremities. Occ crackles. Ok cap refill and warm extremities. Pallor and somewhat dry mucous membranes. Elderly, ill appearing.     * Sepsis due to pneumonia (HCC)   Assessment & Plan    This is severe sepsis with the following associated acute organ dysfunction(s): metabolic/septic encephalopathy.   Lactic acidosis resolved with IVF, Normotensive now with IVF.  Continue Rocephin but will watch rash. D/c azithromycin bec of arrhythmogenic risk  Because of his code status, current agitation, and declining LP last time, " as well as being agitated, I will avoid lumbar puncture (to evaluate for meningitis) for now and will treat empirically as before.. He was already started on antibiotics. I will add ampicillin, vancomycin for CNS coverage as well as acyclovir as he had before.  Will consider ID consultation depending on culture results.  Resp and O2 per protocol   Ordered echo given history of PEs, altered mentation, hypoxia, possible edema on CXR  Ordered EKG. Poor study with artifacts/distortions       Chronic anticoagulation   Assessment & Plan    For history of PE  No active bleeding  COntinue to be dosed by Pharmacy     Rash   Assessment & Plan    Does not appear to be shingles as distribution is not dermatomal, not vesicular and not painful.  Could be drug reaction; wd/c azithro now anyway.   Observe rash.     Toxic metabolic encephalopathy   Assessment & Plan    Multifactorial but most likely due to infection  Empirically covering for meningitis as above  Treat underlying issues.     DNR (do not resuscitate) discussion   Assessment & Plan    Discussed with wife.  She told me he is DNR/DNI  Ordered Palliative for official IPOLST     History of pulmonary embolism- (present on admission)   Assessment & Plan    Noted. On anticoagulation.     Reviewed vitals, labs, imaging, staff notes.  Discussed assessment and plan with Mani Louise and wife  Discussed with RN  Discussed with Pharmacy.    I spent 70 minutes of prolonged not face to face inpatient care.  START 1130 STOP 1240  Evaluated the rash  Discussed plan with wife and nurse in detail  Adjusted antibiotics  Reviewed imaging and vitals to determine level of care  Goals of care discussion.    Also spoke with son who wanted an update  Patient was getting better however will need to make sure he is afebrile>24hrs, leukocytosis resolves, BP remains stable and that cultures are negative, PT/OT eval before considering safe discharge.

## 2019-02-02 NOTE — ED NOTES
Alerted CN of sepsis score=5, code sepsis called.  
ERP at bedside.  
Hospitalist informed of increasing restless behavior, new orders received.   Wife at bedside continuing to redirect and reorient pt.   
Med rec complete per pt S/O at bedside   NKDA  No oral ABX within last 30 days   
Pt becoming increasingly confused, restless, and states he wants to leave.   Pt reoriented and comfort measures provided.   
Pt continues to be restless, linens changed and toileting offered.     
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  Other h/o PE, takes warfarin daily.  
Pt medicated per MAR  
Pt to CT scan.  
Pt transported to [facility]02 on 2L NC with stable VS by transport tech with wife following.   
Report given to T4 RN. Pt is ready for transport.   
Urine specimen collected, IVF and abx started.  Pt to remain NPO due to ager and confusion, requested change to apap dose to suppository.  
never had flu vaccine

## 2019-02-02 NOTE — PROGRESS NOTES
2 RN skin check complete.    BLE dusky and red.  Scab to right shin.  Redness to outside of left pinky toe, blanching.   Scabbed abrasion to left cheek.   Sporadic rash to chest and abdomen.   Skin intact over all areas of bony prominence with no signs of redness.

## 2019-02-02 NOTE — PROGRESS NOTES
Pt transferred to unit from ER.  Wife present at bedside.  Pt AAO to self only, very agitated, trying to get out of bed, and pulling at IV line.  Dr. Hilton updated on pt's condition, orders received for soft bilateral wrist restraints and increased dose of ativan, okay to give pt a dose now and then begin regular Q4 hour schedule.   Pt incontinent of urine in bed. Condom catheter placed on pt.  Pt with audible wheezing, requiring 2 L of oxygen.   Regular diet in place.  High fall risk, bed alarm put on pt.   POC reviewed with pt and wife.  Call light within reach, pt and wife educated to call for assistance as needed.  Hourly rounding in place.

## 2019-02-02 NOTE — DISCHARGE PLANNING
Pt lives with wife in 2 story condo but sleeps on main level. Neither pt nor wife drive and they utilize '[employer]' in [facility] for appts, shopping, etc. Facesheet shows [employer] as secondary but wife states that is not correct that it is '[employer]'. PAR (Sushma) notified. Son (Mani) will provide transportation when discharged (he's visiting from [place]).        Care Transition Team Assessment    Information Source  Orientation : Oriented x 4, Disoriented to Place, Disoriented to Time, Disoriented to Event  Information Given By: Spouse  Informant's Name: Nydia  Who is responsible for making decisions for patient? : Patient    Readmission Evaluation  Is this a readmission?: No    Elopement Risk  Legal Hold: No    Interdisciplinary Discharge Planning  Does Admitting Nurse Feel This Could be a Complex Discharge?: No  Primary Care Physician: Shan Richey ([facility])  Lives with - Patient's Self Care Capacity: Spouse  Support Systems: Spouse / Significant Other, Family Member(s), Friends / Neighbors  Housing / Facility: 2 Story Apartment / Condo  Do You Take your Prescribed Medications Regularly: Yes  Able to Return to Previous ADL's: Future Time w/Therapy  Mobility Issues: No  Prior Services: None  Patient Expects to be Discharged to:: home  Assistance Needed: Unknown at this Time  Durable Medical Equipment: Not Applicable    Discharge Preparedness  Prior Functional Level: Independent with Activities of Daily Living  Difficulity with ADLs: None  Difficulity with IADLs: Driving    Functional Assesment  Prior Functional Level: Independent with Activities of Daily Living    Finances  Financial Barriers to Discharge: No  Prescription Coverage: Yes     Advance Directive  Advance Directive?: Living Will    Domestic Abuse  Have you ever been the victim of abuse or violence?: No         Discharge Risks or Barriers  Discharge risks or barriers?: No    Anticipated Discharge Information  Anticipated discharge  disposition: Home  Discharge Address: facesheet verified

## 2019-02-02 NOTE — CARE PLAN
Problem: Communication  Goal: The ability to communicate needs accurately and effectively will improve  Outcome: PROGRESSING AS EXPECTED  POC reviewed with pt and wife. All questions answered at this time.     Problem: Safety  Goal: Will remain free from injury  Outcome: PROGRESSING AS EXPECTED  Pt rated a high fall risk per [employer] fall assessment tool. All aproriate interventions in place.

## 2019-02-03 ENCOUNTER — APPOINTMENT (OUTPATIENT)
Dept: CARDIOLOGY | Facility: MEDICAL CENTER | Age: 84
DRG: 871 | End: 2019-02-03
Attending: INTERNAL MEDICINE
Payer: MEDICARE

## 2019-02-03 LAB
ALBUMIN SERPL BCP-MCNC: 3.1 G/DL (ref 3.2–4.9)
ALBUMIN/GLOB SERPL: 1.3 G/DL
ALP SERPL-CCNC: 43 U/L (ref 30–99)
ALT SERPL-CCNC: 10 U/L (ref 2–50)
ANION GAP SERPL CALC-SCNC: 7 MMOL/L (ref 0–11.9)
AST SERPL-CCNC: 29 U/L (ref 12–45)
BASOPHILS # BLD AUTO: 0.8 % (ref 0–1.8)
BASOPHILS # BLD: 0.06 K/UL (ref 0–0.12)
BILIRUB SERPL-MCNC: 0.7 MG/DL (ref 0.1–1.5)
BUN SERPL-MCNC: 16 MG/DL (ref 8–22)
CALCIUM SERPL-MCNC: 8 MG/DL (ref 8.5–10.5)
CHLORIDE SERPL-SCNC: 106 MMOL/L (ref 96–112)
CO2 SERPL-SCNC: 24 MMOL/L (ref 20–33)
CREAT SERPL-MCNC: 1.07 MG/DL (ref 0.5–1.4)
EOSINOPHIL # BLD AUTO: 0.21 K/UL (ref 0–0.51)
EOSINOPHIL NFR BLD: 2.8 % (ref 0–6.9)
ERYTHROCYTE [DISTWIDTH] IN BLOOD BY AUTOMATED COUNT: 54 FL (ref 35.9–50)
GLOBULIN SER CALC-MCNC: 2.3 G/DL (ref 1.9–3.5)
GLUCOSE SERPL-MCNC: 101 MG/DL (ref 65–99)
HCT VFR BLD AUTO: 34.9 % (ref 42–52)
HGB BLD-MCNC: 12.2 G/DL (ref 14–18)
IMM GRANULOCYTES # BLD AUTO: 0.02 K/UL (ref 0–0.11)
IMM GRANULOCYTES NFR BLD AUTO: 0.3 % (ref 0–0.9)
INR PPP: 2.07 (ref 0.87–1.13)
LV EJECT FRACT  99904: 80
LV EJECT FRACT MOD 2C 99903: 76.06
LV EJECT FRACT MOD 4C 99902: 71.29
LV EJECT FRACT MOD BP 99901: 74.11
LYMPHOCYTES # BLD AUTO: 1.53 K/UL (ref 1–4.8)
LYMPHOCYTES NFR BLD: 20.5 % (ref 22–41)
MCH RBC QN AUTO: 37.3 PG (ref 27–33)
MCHC RBC AUTO-ENTMCNC: 35 G/DL (ref 33.7–35.3)
MCV RBC AUTO: 106.7 FL (ref 81.4–97.8)
MONOCYTES # BLD AUTO: 0.72 K/UL (ref 0–0.85)
MONOCYTES NFR BLD AUTO: 9.7 % (ref 0–13.4)
NEUTROPHILS # BLD AUTO: 4.92 K/UL (ref 1.82–7.42)
NEUTROPHILS NFR BLD: 65.9 % (ref 44–72)
NRBC # BLD AUTO: 0 K/UL
NRBC BLD-RTO: 0 /100 WBC
PLATELET # BLD AUTO: 158 K/UL (ref 164–446)
PMV BLD AUTO: 10.3 FL (ref 9–12.9)
POTASSIUM SERPL-SCNC: 3.8 MMOL/L (ref 3.6–5.5)
PROT SERPL-MCNC: 5.4 G/DL (ref 6–8.2)
PROTHROMBIN TIME: 23.4 SEC (ref 12–14.6)
RBC # BLD AUTO: 3.27 M/UL (ref 4.7–6.1)
SODIUM SERPL-SCNC: 137 MMOL/L (ref 135–145)
VANCOMYCIN SERPL-MCNC: 15.7 UG/ML
WBC # BLD AUTO: 7.5 K/UL (ref 4.8–10.8)

## 2019-02-03 PROCEDURE — 700102 HCHG RX REV CODE 250 W/ 637 OVERRIDE(OP): Performed by: INTERNAL MEDICINE

## 2019-02-03 PROCEDURE — A9270 NON-COVERED ITEM OR SERVICE: HCPCS | Performed by: INTERNAL MEDICINE

## 2019-02-03 PROCEDURE — 85610 PROTHROMBIN TIME: CPT

## 2019-02-03 PROCEDURE — 700105 HCHG RX REV CODE 258

## 2019-02-03 PROCEDURE — 93306 TTE W/DOPPLER COMPLETE: CPT | Mod: 26 | Performed by: INTERNAL MEDICINE

## 2019-02-03 PROCEDURE — 700105 HCHG RX REV CODE 258: Performed by: HOSPITALIST

## 2019-02-03 PROCEDURE — 85025 COMPLETE CBC W/AUTO DIFF WBC: CPT

## 2019-02-03 PROCEDURE — 80053 COMPREHEN METABOLIC PANEL: CPT

## 2019-02-03 PROCEDURE — 93306 TTE W/DOPPLER COMPLETE: CPT

## 2019-02-03 PROCEDURE — 80202 ASSAY OF VANCOMYCIN: CPT

## 2019-02-03 PROCEDURE — 700111 HCHG RX REV CODE 636 W/ 250 OVERRIDE (IP): Performed by: INTERNAL MEDICINE

## 2019-02-03 PROCEDURE — 99233 SBSQ HOSP IP/OBS HIGH 50: CPT | Performed by: HOSPITALIST

## 2019-02-03 PROCEDURE — 700105 HCHG RX REV CODE 258: Performed by: INTERNAL MEDICINE

## 2019-02-03 PROCEDURE — 97161 PT EVAL LOW COMPLEX 20 MIN: CPT

## 2019-02-03 PROCEDURE — 770020 HCHG ROOM/CARE - TELE (206)

## 2019-02-03 PROCEDURE — 700111 HCHG RX REV CODE 636 W/ 250 OVERRIDE (IP): Performed by: HOSPITALIST

## 2019-02-03 PROCEDURE — 36415 COLL VENOUS BLD VENIPUNCTURE: CPT

## 2019-02-03 RX ORDER — SODIUM CHLORIDE 9 MG/ML
INJECTION, SOLUTION INTRAVENOUS
Status: COMPLETED
Start: 2019-02-03 | End: 2019-02-03

## 2019-02-03 RX ADMIN — TRIAZOLAM 0.5 MG: 0.25 TABLET ORAL at 21:51

## 2019-02-03 RX ADMIN — CEFTRIAXONE SODIUM 2 G: 2 INJECTION, POWDER, FOR SOLUTION INTRAMUSCULAR; INTRAVENOUS at 05:53

## 2019-02-03 RX ADMIN — Medication 1 CAPSULE: at 09:08

## 2019-02-03 RX ADMIN — VANCOMYCIN HYDROCHLORIDE 1500 MG: 100 INJECTION, POWDER, LYOPHILIZED, FOR SOLUTION INTRAVENOUS at 15:11

## 2019-02-03 RX ADMIN — ACYCLOVIR SODIUM 680 MG: 500 INJECTION, SOLUTION INTRAVENOUS at 05:53

## 2019-02-03 RX ADMIN — SENNOSIDES AND DOCUSATE SODIUM 2 TABLET: 8.6; 5 TABLET ORAL at 05:53

## 2019-02-03 RX ADMIN — AMPICILLIN SODIUM 2000 MG: 2 INJECTION, POWDER, FOR SOLUTION INTRAMUSCULAR; INTRAVENOUS at 05:53

## 2019-02-03 RX ADMIN — SODIUM CHLORIDE 500 ML: 9 INJECTION, SOLUTION INTRAVENOUS at 09:09

## 2019-02-03 RX ADMIN — CEFTRIAXONE SODIUM 2 G: 2 INJECTION, POWDER, FOR SOLUTION INTRAMUSCULAR; INTRAVENOUS at 17:14

## 2019-02-03 RX ADMIN — AMPICILLIN SODIUM 2000 MG: 2 INJECTION, POWDER, FOR SOLUTION INTRAMUSCULAR; INTRAVENOUS at 11:59

## 2019-02-03 RX ADMIN — AMPICILLIN SODIUM 2000 MG: 2 INJECTION, POWDER, FOR SOLUTION INTRAMUSCULAR; INTRAVENOUS at 17:14

## 2019-02-03 RX ADMIN — ACYCLOVIR SODIUM 680 MG: 500 INJECTION, SOLUTION INTRAVENOUS at 17:14

## 2019-02-03 RX ADMIN — WARFARIN SODIUM 2.5 MG: 2.5 TABLET ORAL at 18:51

## 2019-02-03 ASSESSMENT — ENCOUNTER SYMPTOMS
ABDOMINAL PAIN: 0
TREMORS: 0
NAUSEA: 0
DIZZINESS: 0
WEIGHT LOSS: 0
NECK PAIN: 0
PHOTOPHOBIA: 0
SPUTUM PRODUCTION: 0
FALLS: 0
HEARTBURN: 0
CHILLS: 0
TINGLING: 0
COUGH: 1
DEPRESSION: 0
BLURRED VISION: 0
SHORTNESS OF BREATH: 0
MYALGIAS: 0
CLAUDICATION: 0
SPEECH CHANGE: 0
VOMITING: 0
DIARRHEA: 0
EYE PAIN: 0
HEMOPTYSIS: 0
HEADACHES: 0
PALPITATIONS: 0
ORTHOPNEA: 0
FLANK PAIN: 0
WEAKNESS: 0
EYE DISCHARGE: 0
BACK PAIN: 0
HALLUCINATIONS: 0
DIAPHORESIS: 0
SENSORY CHANGE: 0
DOUBLE VISION: 0
FEVER: 0

## 2019-02-03 ASSESSMENT — GAIT ASSESSMENTS
ASSISTIVE DEVICE: FRONT WHEEL WALKER
DEVIATION: INCREASED BASE OF SUPPORT
DISTANCE (FEET): 20
GAIT LEVEL OF ASSIST: MINIMAL ASSIST

## 2019-02-03 ASSESSMENT — COGNITIVE AND FUNCTIONAL STATUS - GENERAL
CLIMB 3 TO 5 STEPS WITH RAILING: A LOT
WALKING IN HOSPITAL ROOM: A LITTLE
MOBILITY SCORE: 17
SUGGESTED CMS G CODE MODIFIER MOBILITY: CK
MOVING FROM LYING ON BACK TO SITTING ON SIDE OF FLAT BED: A LITTLE
STANDING UP FROM CHAIR USING ARMS: A LITTLE
MOVING TO AND FROM BED TO CHAIR: A LITTLE
TURNING FROM BACK TO SIDE WHILE IN FLAT BAD: A LITTLE

## 2019-02-03 NOTE — THERAPY
"89 y/o male adm for confusion and fever, dx with PNA. Pt  [place] and does not have his hearing aides with him. Pt's balance with amb with FWW with increased lateral lean bilaterally. Left fww in hallway while with this PT and mod assits required due to increased LOB. Poor safety awareness. Acute PT to address bed mobility, transfers, gait with FWW, stair negotiation , balance and strength for pt to achieve higher level of function to facilitae a safe DC.    Physical Therapy Evaluation completed.   Bed Mobility:  Supine to Sit: Minimal Assist  Transfers: Sit to Stand: Minimal Assist  Gait: Level Of Assist: Minimal Assist with Front-Wheel Walker       Plan of Care: Will benefit from Physical Therapy 3 times per week  Discharge Recommendations: Equipment: Will Continue to Assess for Equipment Needs. Post-acute therapy Discharge to a transitional care facility for continued skilled therapy services. and Discharge to home with outpatient or home health for additional skilled therapy services.    See \"Rehab Therapy-Acute\" Patient Summary Report for complete documentation.     "

## 2019-02-03 NOTE — PROGRESS NOTES
Pharmacy Warfarin Monitoring     Date: 2/2/2019  Reason for Anticoagulation: Deep Vein Thrombosis, Pulmonary Embolism      Hemoglobin Value: 14.1  Hematocrit Value: (!) 39.5  Lab Platelet Value: 189  Target INR: 2.0 to 3.0    INR from last 7 days     Date/Time INR Value    02/02/19 0557 (!)  2.3        Dose from last 7 days     Date/Time Dose (mg)    02/02/19 1500  2.5        Average Dose (mg):  (1.25 mg Thursdays, 2.5 mg ROW)  Significant Interactions: Antibiotics  Bridge Therapy: Not indicated    Reversal Agent Administered: Not Applicable  Comments and Plan: Patient admitted with possible sepsis.  Warfarin to continue for history of PE and DVT.  Patient therapeutic upon arrival with INR of 2.3.  Will plan to continue home dose and give 2.5 mg this evening.  H/H stable.  Pharmacy to follow daily while inpatient.    Pharmacist suggested discharge dosing: Home regimen of 1.25 mg on Thursdays and 2.5 mg ROW appears reasonable.       Thank you!  Adelaide Sanders, PharmD, BCCCP

## 2019-02-03 NOTE — PALLIATIVE CARE
Duplicate Palliative Care referral.  Original referral received 2/2/19 @ 12:15.  Palliative Care consult to follow.

## 2019-02-03 NOTE — PROGRESS NOTES
Bedside report received, pt care assumed. Pt is awake, disoriented to year and city. Pt states he knows he's at the hospital, can state , knows family members at bedside, and is cooperative with following simple directions. Pt is [place], R ear device in place. Pt's wife, son, and pt educated on plan of care, call bell use. Bed alarm for safety. Pt's family staying at bedside in private room with patient per request. Will continue to monitor.

## 2019-02-03 NOTE — PROGRESS NOTES
"Pharmacy Kinetics 89 y.o. male on vancomycin day # 2 2/3/2019    Currently on Vancomycin 1900 mg iv LD (19 at 1415)    Indication for Treatment: Possible CNS Infection    Pertinent history per medical record: Admitted on 2019 for ALOC. Per notes, patient has encephalopathy and is agitated. Patient has refused lumbar puncture prior to this admission to evaluate for meningitis and per provider, will not perform another lumbar puncture at this time. Empiric antibiotics have been started for CNS coverage.    Other antibiotics: Acyclovir, ampicillin, and ceftriaxone    Allergies: Patient has no known allergies.     List concerns for renal function: age and combination with acyclovir    Pertinent cultures to date:     NGTD in blood or urine    MRSA nares swab if pneumonia is a concern (ordered/positive/negative/n-a): n/a    Recent Labs      19   0557  19   0245   WBC  11.9*  7.5   NEUTSPOLYS  84.40*  65.90     Recent Labs      19   0557  19   0245   BUN  14  16   CREATININE  0.91  1.07   ALBUMIN  4.1  3.1*     Recent Labs      19   1259   VANCORANDOM  15.7     Intake/Output Summary (Last 24 hours) at 19 1408  Last data filed at 19 0653   Gross per 24 hour   Intake             1140 ml   Output              250 ml   Net              890 ml      Blood pressure 131/67, pulse 74, temperature 36.8 °C (98.2 °F), temperature source Temporal, resp. rate 18, height 1.727 m (5' 8\"), weight 75.7 kg (166 lb 14.2 oz), SpO2 93 %. Temp (24hrs), Av.1 °C (98.8 °F), Min:36.8 °C (98.2 °F), Max:37.5 °C (99.5 °F)      A/P   1. Vancomycin dose change: starting vancomycin 1500 mg IV q24h  2. Next vancomycin level: two or three days (not ordered)  3. Goal trough: 18-22 mcg/mL  4. Comments: Discussed on rounds, per MD she will consult ID at this time to help with abx selection. Scr has been stable. Vancomycin level after load was slightly sub-therapeutic. Will initiate scheduled q24h dosing at " approx. 20 mg/kg. Pharmacy will monitor for de-escalation if appropriate.    Florina London, PharmD

## 2019-02-03 NOTE — PROGRESS NOTES
Inpatient Anticoagulation Service Note    Date: 2/3/2019  Reason for Anticoagulation: Deep Vein Thrombosis, Pulmonary Embolism      Hemoglobin Value: (!) 12.2  Hematocrit Value: (!) 34.9  Lab Platelet Value: (!) 158  Target INR: 2.0 to 3.0    INR from last 7 days     Date/Time INR Value    02/03/19 0756 (!)  2.07    02/02/19 0557 (!)  2.3        Dose from last 7 days     Date/Time Dose (mg)    02/03/19 1400  2.5    02/02/19 1500  2.5        Average Dose (mg):  1.25 mg Thursdays, 2.5 mg ROW  Significant Interactions: Antibiotics  Bridge Therapy: No  Reversal Agent Administered: Not Applicable  Comments: Warfarin continued for history of VTE. INR is therapeutic and dose was given last night. Will continue with warfarin 2.5 mg PO daily except for 1.25 mg on Thursdays. No new DDIs and H/H is stable. Pharmacy will continue to monitor.    Plan:  Warfarin 2.5 mg PO daily except for 1.25 mg on Thursdays.  Education Material Provided?: No (Chronic warfarin patient)  Pharmacist suggested discharge dosing: Warfarin 2.5 mg PO daily except for 1.25 mg on Thursdays with follow-up with anticoagulation clinic post-discharge.     Florina London, PharmD

## 2019-02-04 PROBLEM — J96.01 ACUTE RESPIRATORY FAILURE WITH HYPOXIA (HCC): Status: ACTIVE | Noted: 2019-02-04

## 2019-02-04 LAB
BACTERIA UR CULT: NORMAL
INR PPP: 2.62 (ref 0.87–1.13)
PROTHROMBIN TIME: 28 SEC (ref 12–14.6)
SIGNIFICANT IND 70042: NORMAL
SITE SITE: NORMAL
SOURCE SOURCE: NORMAL

## 2019-02-04 PROCEDURE — 85610 PROTHROMBIN TIME: CPT

## 2019-02-04 PROCEDURE — 36415 COLL VENOUS BLD VENIPUNCTURE: CPT

## 2019-02-04 PROCEDURE — A9270 NON-COVERED ITEM OR SERVICE: HCPCS | Performed by: INTERNAL MEDICINE

## 2019-02-04 PROCEDURE — 99221 1ST HOSP IP/OBS SF/LOW 40: CPT | Performed by: INTERNAL MEDICINE

## 2019-02-04 PROCEDURE — 700102 HCHG RX REV CODE 250 W/ 637 OVERRIDE(OP): Performed by: INTERNAL MEDICINE

## 2019-02-04 PROCEDURE — 700111 HCHG RX REV CODE 636 W/ 250 OVERRIDE (IP): Performed by: INTERNAL MEDICINE

## 2019-02-04 PROCEDURE — 700111 HCHG RX REV CODE 636 W/ 250 OVERRIDE (IP): Performed by: HOSPITALIST

## 2019-02-04 PROCEDURE — 700105 HCHG RX REV CODE 258: Performed by: HOSPITALIST

## 2019-02-04 PROCEDURE — 97165 OT EVAL LOW COMPLEX 30 MIN: CPT

## 2019-02-04 PROCEDURE — 99232 SBSQ HOSP IP/OBS MODERATE 35: CPT | Performed by: HOSPITALIST

## 2019-02-04 PROCEDURE — 700105 HCHG RX REV CODE 258: Performed by: INTERNAL MEDICINE

## 2019-02-04 PROCEDURE — 770020 HCHG ROOM/CARE - TELE (206)

## 2019-02-04 RX ORDER — HYDRALAZINE HYDROCHLORIDE 20 MG/ML
10 INJECTION INTRAMUSCULAR; INTRAVENOUS EVERY 6 HOURS PRN
Status: DISCONTINUED | OUTPATIENT
Start: 2019-02-04 | End: 2019-02-06 | Stop reason: HOSPADM

## 2019-02-04 RX ADMIN — VANCOMYCIN HYDROCHLORIDE 1500 MG: 100 INJECTION, POWDER, LYOPHILIZED, FOR SOLUTION INTRAVENOUS at 14:27

## 2019-02-04 RX ADMIN — SENNOSIDES AND DOCUSATE SODIUM 2 TABLET: 8.6; 5 TABLET ORAL at 17:17

## 2019-02-04 RX ADMIN — WARFARIN SODIUM 2.5 MG: 2.5 TABLET ORAL at 17:17

## 2019-02-04 RX ADMIN — AMPICILLIN SODIUM 2000 MG: 2 INJECTION, POWDER, FOR SOLUTION INTRAMUSCULAR; INTRAVENOUS at 05:38

## 2019-02-04 RX ADMIN — Medication 1 CAPSULE: at 09:19

## 2019-02-04 RX ADMIN — HYDRALAZINE HYDROCHLORIDE 10 MG: 20 INJECTION INTRAMUSCULAR; INTRAVENOUS at 17:18

## 2019-02-04 RX ADMIN — ACYCLOVIR SODIUM 680 MG: 500 INJECTION, SOLUTION INTRAVENOUS at 06:25

## 2019-02-04 RX ADMIN — AMPICILLIN SODIUM 2000 MG: 2 INJECTION, POWDER, FOR SOLUTION INTRAMUSCULAR; INTRAVENOUS at 01:18

## 2019-02-04 RX ADMIN — AMPICILLIN SODIUM 2000 MG: 2 INJECTION, POWDER, FOR SOLUTION INTRAMUSCULAR; INTRAVENOUS at 12:23

## 2019-02-04 RX ADMIN — CEFTRIAXONE SODIUM 2 G: 2 INJECTION, POWDER, FOR SOLUTION INTRAMUSCULAR; INTRAVENOUS at 05:04

## 2019-02-04 RX ADMIN — SENNOSIDES AND DOCUSATE SODIUM 2 TABLET: 8.6; 5 TABLET ORAL at 09:20

## 2019-02-04 RX ADMIN — TRIAZOLAM 0.5 MG: 0.25 TABLET ORAL at 21:31

## 2019-02-04 ASSESSMENT — ENCOUNTER SYMPTOMS
PHOTOPHOBIA: 0
NAUSEA: 0
FEVER: 0
COUGH: 1
PALPITATIONS: 0
HEMOPTYSIS: 0
DIZZINESS: 0
FALLS: 0
TREMORS: 0
WEAKNESS: 0
BLURRED VISION: 0
EYE PAIN: 0
CHILLS: 0
SPEECH CHANGE: 0
DEPRESSION: 0
SHORTNESS OF BREATH: 1
FLANK PAIN: 0
HALLUCINATIONS: 0
TINGLING: 0
DIARRHEA: 0
EYE DISCHARGE: 0
DIAPHORESIS: 0
FALLS: 1
MYALGIAS: 0
ABDOMINAL PAIN: 0
SPUTUM PRODUCTION: 1
CLAUDICATION: 0
ORTHOPNEA: 0
SPUTUM PRODUCTION: 0
SENSORY CHANGE: 0
WEIGHT LOSS: 0
HEARTBURN: 0
VOMITING: 0
DOUBLE VISION: 0
BACK PAIN: 0
NECK PAIN: 0
HEADACHES: 0

## 2019-02-04 ASSESSMENT — COGNITIVE AND FUNCTIONAL STATUS - GENERAL
TOILETING: A LITTLE
DRESSING REGULAR LOWER BODY CLOTHING: A LITTLE
DRESSING REGULAR UPPER BODY CLOTHING: A LITTLE
PERSONAL GROOMING: A LITTLE
DAILY ACTIVITIY SCORE: 18
EATING MEALS: A LITTLE
HELP NEEDED FOR BATHING: A LITTLE
SUGGESTED CMS G CODE MODIFIER DAILY ACTIVITY: CK

## 2019-02-04 ASSESSMENT — ACTIVITIES OF DAILY LIVING (ADL): TOILETING: INDEPENDENT

## 2019-02-04 NOTE — PROGRESS NOTES
[facility] Medicine Daily Progress Note    Date of Service  2/4/2019    Chief Complaint  89 y.o. male admitted 2/2/2019 with [facility]    Hospital Course  89 y.o. male who presented 2/2/2019 with mental status change, urinary incontinence, fever, abnormal chest x-ray.  In the emergency room, pt was found to be hypotensive, febrile, with possible infiltrates on chest x-ray.  Did not appear to have urinary tract infection.    He was started on IV antibiotics, IV fluids for sepsis likely due to pneumonia. Patient has hx of encephalopathy and has refused LP in the past and was empirically started on antibiotics and antivirals for presumed meningitis.  Interval Problem Update  Seen and examined, wife and son at bedside, he is AAOx3. Per family this is 95% back to his baseline mentation, patient does not believe he has PNA or any infection and wants to go home. Patient has never been evaluated or workedup for dementia outpatient. Patient wants to go home, otherwise no complains    2/4: patient up eating breakfast, hard of hearing, continue to have respiratory distress at times, increased secretions  All? answered    Consultants/Specialty  ID  palliative    Code Status  DNR    Disposition  tbd    Review of Systems  Review of Systems   Constitutional: Negative for chills, diaphoresis, fever, malaise/fatigue and weight loss.   HENT: Negative for congestion, ear discharge, ear pain, hearing loss, nosebleeds and tinnitus.    Eyes: Negative for blurred vision, double vision, photophobia, pain and discharge.   Respiratory: Positive for cough, sputum production and shortness of breath. Negative for hemoptysis.    Cardiovascular: Negative for chest pain, palpitations, orthopnea, claudication and leg swelling.   Gastrointestinal: Negative for abdominal pain, diarrhea, heartburn, nausea and vomiting.   Genitourinary: Positive for frequency. Negative for dysuria, flank pain, hematuria and urgency.   Musculoskeletal: Negative for  back pain, falls, joint pain, myalgias and neck pain.   Neurological: Negative for dizziness, tingling, tremors, sensory change, speech change, weakness and headaches.   Psychiatric/Behavioral: Negative for depression, hallucinations and suicidal ideas.        Physical Exam  Temp:  [36.3 °C (97.3 °F)-37.2 °C (98.9 °F)] 36.7 °C (98.1 °F)  Pulse:  [61-80] 67  Resp:  [16-18] 18  BP: (134-157)/(58-79) 140/73  SpO2:  [95 %-99 %] 99 %    Physical Exam   Constitutional: He is oriented to person, place, and time. He appears well-developed and well-nourished. No distress.   HENT:   Head: Normocephalic and atraumatic.   Mouth/Throat: No oropharyngeal exudate.   Eyes: Pupils are equal, round, and reactive to light. Conjunctivae are normal. No scleral icterus.   Neck: Normal range of motion. Neck supple. No JVD present. No thyromegaly present.   Cardiovascular: Normal rate and regular rhythm.    No murmur heard.  Pulmonary/Chest: Effort normal. No respiratory distress. He has no wheezes. He has rales.   Diminished R/L   Abdominal: Soft. Bowel sounds are normal. He exhibits no distension. There is no tenderness. There is no rebound.   Musculoskeletal: Normal range of motion. He exhibits no edema or tenderness.   Lymphadenopathy:     He has no cervical adenopathy.   Neurological: He is alert and oriented to person, place, and time. He exhibits normal muscle tone.   Skin: Skin is warm and dry. No rash noted. No erythema. No pallor.   Psychiatric: He has a normal mood and affect. His behavior is normal. Judgment and thought content normal.       Fluids    Intake/Output Summary (Last 24 hours) at 02/04/19 1436  Last data filed at 02/04/19 0600   Gross per 24 hour   Intake              180 ml   Output              500 ml   Net             -320 ml       Laboratory  Recent Labs      02/02/19   0557  02/03/19   0245   WBC  11.9*  7.5   RBC  3.76*  3.27*   HEMOGLOBIN  14.1  12.2*   HEMATOCRIT  39.5*  34.9*   MCV  105.1*  106.7*   MCH   37.5*  37.3*   MCHC  35.7*  35.0   RDW  50.5*  54.0*   PLATELETCT  189  158*   MPV  10.1  10.3     Recent Labs      02/02/19   0557  02/03/19   0245   SODIUM  134*  137   POTASSIUM  4.4  3.8   CHLORIDE  102  106   CO2  23  24   GLUCOSE  111*  101*   BUN  14  16   CREATININE  0.91  1.07   CALCIUM  8.9  8.0*     Recent Labs      02/02/19   0557  02/03/19   0756  02/04/19   0547   APTT  32.2   --    --    INR  2.30*  2.07*  2.62*               Imaging  EC-ECHOCARDIOGRAM COMPLETE W/O CONT   Final Result      DX-CHEST-PORTABLE (1 VIEW)   Final Result         1.  Mild pulmonary edema and/or infiltrates   2.  Cardiomegaly   3.  Atherosclerosis      CT-HEAD W/O   Final Result         1.  No acute intracranial abnormality is identified, there are nonspecific white matter changes, commonly associated with small vessel ischemic disease.  Associated mild cerebral atrophy is noted.   2.  Atherosclerosis.           Assessment/Plan  * Sepsis due to pneumonia (HCC)   Assessment & Plan    This is severe sepsis with the following associated acute organ dysfunction(s): metabolic/septic encephalopathy.   2/2 acute PNA  Lactic acidosis resolved with IVF, Normotensive now with IVF.  T/m for meningitis empirically given hx, refusing LP; dc t/m per ID  bcx neg  Now only on rocefin  Resp and O2 per protocol   Echo Hyperdynamic left ventricular systolic function.  Left ventricular   ejection fraction is visually estimated to be greater than 75%.       Acute respiratory failure with hypoxia (HCC)   Assessment & Plan    Likely 2/2 acute infection- PNA  Rodanthe weaning  Continue abx  bcx neg    May need home O2 set up     Chronic anticoagulation   Assessment & Plan    For history of PE  No active bleeding  Continue warfarin  Closely monitor INR 2.62     Rash   Assessment & Plan    Resolved, observe     Toxic metabolic encephalopathy   Assessment & Plan    Multifactorial but most likely due to infection  Empirically covering for meningitis as  above; however ID does not think he has meningitis and I agree, will dc antiviral, continue only on rocefin  Treat underlying issues.     DNR (do not resuscitate) discussion   Assessment & Plan    Discussed with wife.  She told me he is DNR/DNI  Ordered Palliative for official IPOLST     History of pulmonary embolism- (present on admission)   Assessment & Plan    Noted. On anticoagulation.          VTE prophylaxis: warfarin

## 2019-02-04 NOTE — PROGRESS NOTES
Inpatient Anticoagulation Service Note    Date: 2/4/2019  Reason for Anticoagulation: Deep Vein Thrombosis, Pulmonary Embolism        Hemoglobin Value: (!) 12.2  Hematocrit Value: (!) 34.9  Lab Platelet Value: (!) 158  Target INR: 2.0 to 3.0    INR from last 7 days     Date/Time INR Value    02/04/19 0547 (!)  2.62    02/03/19 0756 (!)  2.07    02/02/19 0557 (!)  2.3        Dose from last 7 days     Date/Time Dose (mg)    02/04/19 1300  2.5    02/03/19 1400  2.5    02/02/19 1500  2.5        Average Dose (mg):  (1.25 mg Thursdays, 2.5 mg ROW)  Significant Interactions: Antibiotics  Bridge Therapy: No   Reversal Agent Administered: Not Applicable    HPI: Patient admitted for encephalopathy likely 2/2 to infection, Dx: PNA, r/o meningitis. 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 Warfarin is managed outpatient by the [facility] and per records, patient is prescribed warfarin 1.25mg po every Thursday and warfarin 2.5mg po on all other days with consistent INR readings within therapeutic range in clinic visit.     Assessment: INR remains within therapeutic range with large trend upward on usual home dosing regimen.   Potential drug-drug interactions identified with acute inpatient medications: Antibiotics - potential to increase INR. ID consulted, awaiting full consult.   Potential drug-drug interactions identified with chronic home medications: No DDIs identified  Inpatient Diet: Regular     Plan:  Continue usual home dosing regimen - warfarin 2.5mg tonight. INR monitoring daily for now. May need acute dose adjustment pending INR trend tomorrow.     Education Material Provided?: No (Chronic warfarin patient)    Pharmacist suggested discharge dosing: Warfarin 1.25mg po every Thursday and warfarin 2.5mg po on all other days     Joyce Washington, PharmD

## 2019-02-04 NOTE — CONSULTS
Consults   INFECTIOUS DISEASES INPATIENT CONSULT NOTE     Date of Service: 2/4/2019    Consult Requested By: Karen Lewis M.D.    Reason for Consultation: meningitis     History of Present Illness:   Main Louise is a 89 y.o. admitted 2/2/2019. Pt has a past medical history of seizures, migraines, PE on chronic anticoagulation with warfarin and BPH. Patient presented complaining of acute onset encephalopathy, urine incontinence, and fevers.  He was found walking around the house in the middle the night and urinated on the floor in the bathroom.  Per his wife he is normally alert and oriented.  When she found him that evening he was encephalopathic, had rigors and appeared to fallen. Due to concern for fall and patient had noted abrasion to his left temple, CT scan negative for intracranial bleed.  In the ER the patient was hypertensive, febrile and diagnosed with pneumonia based on infiltrates noted on chest x-ray.  There is also concern for encephalitis based on his mental status and fevers.  He was started on broad-spectrum antibiotics with acyclovir, ampicillin, ceftriaxone and vancomycin.  Patient has refused LP and per notes is now back to his baseline mental status.  TTE completed with no valvular vegetations.  Urine and blood cultures no growth to date.     Today the patient appears to return to his normal mental status is no longer confused, this is also confirmed by his wife.  He denies any headaches and has had no recent headaches leading up to his admission.  He denies any neck stiffness or meningeal signs.  He does endorse some shortness of breath and is on 2 L nasal cannula.  Per the patient and his wife he had some cough prior to admission.       Review Of Systems:  Review of Systems   Constitutional: Negative for chills, diaphoresis, fever and malaise/fatigue.   Respiratory: Positive for cough and shortness of breath. Negative for sputum production.    Cardiovascular: Positive for leg swelling.  Negative for chest pain.   Gastrointestinal: Negative for abdominal pain, diarrhea, nausea and vomiting.   Musculoskeletal: Positive for falls. Negative for joint pain, myalgias and neck pain.   Skin: Negative for rash.   Neurological: Negative for headaches.         PMH:   Past Medical History:   Diagnosis Date   • Pulmonary embolism (HCC)        PSH:  History reviewed. No pertinent surgical history.    FAMILY HX:  History reviewed. No pertinent family history.    SOCIAL HX:    Patient lives at home with his wife he is retired .  He is remote smoking history but quit some 60 years ago.  He reports that he drinks 3 ounces of vodka every night before bed.       Social History     Social History   • Marital status:      Spouse name: N/A   • Number of children: N/A   • Years of education: N/A     Occupational History   • Not on file.     Social History Main Topics   • Smoking status: Never Smoker   • Smokeless tobacco: Not on file   • Alcohol use No   • Drug use: No   • Sexual activity: Not on file     Other Topics Concern   • Not on file     Social History Narrative   • No narrative on file     History   Smoking Status   • Never Smoker   Smokeless Tobacco   • Not on file     History   Alcohol Use No       Allergies/Intolerances:  No Known Allergies    History reviewed with the patient     Other Current Medications:    Current Facility-Administered Medications:   •  vancomycin 1,500 mg in  mL IVPB, 1,500 mg, Intravenous, Q24HR, Karen Lewis M.D., Stopped at 02/03/19 1711  •  triazolam (HALCION) tablet 0.5 mg, 0.5 mg, Oral, HS PRN, Cande Hilton M.D., 0.5 mg at 02/03/19 2151  •  NS infusion 2,247 mL, 30 mL/kg, Intravenous, Once PRN, Cande Hilton M.D.  •  senna-docusate (PERICOLACE or SENOKOT S) 8.6-50 MG per tablet 2 Tab, 2 Tab, Oral, BID, 2 Tab at 02/04/19 0920 **AND** polyethylene glycol/lytes (MIRALAX) PACKET 1 Packet, 1 Packet, Oral, QDAY PRN **AND** magnesium hydroxide  "(MILK OF MAGNESIA) suspension 30 mL, 30 mL, Oral, QDAY PRN **AND** bisacodyl (DULCOLAX) suppository 10 mg, 10 mg, Rectal, QDAY PRN, Cande Hilton M.D.  •  NS (BOLUS) infusion 500 mL, 500 mL, Intravenous, Once PRN, Cande Hilton M.D.  •  acetaminophen (TYLENOL) tablet 650 mg, 650 mg, Oral, Q6HRS PRN, Cande Hilton M.D.  •  MD Alert...Warfarin per Pharmacy, , Other, PHARMACY TO DOSE, Cande Hilton M.D.  •  warfarin (COUMADIN) tablet 2.5 mg, 2.5 mg, Oral, Once per day on Sun Mon Tue Wed Fri Sat, 2.5 mg at 19 1851 **AND** [START ON 2019] warfarin (COUMADIN) tablet 1.25 mg, 1.25 mg, Oral, Once per day on Thu, Cande Hilton M.D.  •  LORazepam (ATIVAN) injection 1 mg, 1 mg, Intravenous, Q4HRS PRN, Cande Hilton M.D., 1 mg at 19 1150  •  lactobacillus rhamnosus (CULTURELLE) capsule 1 Cap, 1 Cap, Oral, QDAY with Breakfast, Jayy Reid M.D., 1 Cap at 19 0919  •  albuterol (PROVENTIL) 2.5mg/0.5ml nebulizer solution 2.5 mg, 2.5 mg, Nebulization, Q4H PRN (RT), Cande Hilton M.D.  •  MD Alert...Vancomycin per Pharmacy, , Other, PHARMACY TO DOSE, Jayy Reid M.D.  •  cefTRIAXone (ROCEPHIN) 2 g in  mL IVPB, 2 g, Intravenous, Q12HRS, Jayy Reid M.D., Stopped at 19 0534  •  acyclovir (ZOVIRAX) 680 mg in  mL IVPB, 680 mg, Intravenous, Q12HRS, Jayy Reid M.D., Stopped at 19 0725  •  ampicillin (OMNIPEN) 2,000 mg in  mL IVPB, 2,000 mg, Intravenous, Q6HRS, Jayy Reid M.D., Last Rate: 200 mL/hr at 19 1223, 2,000 mg at 19 1223  •  diphenhydrAMINE (BENADRYL) tablet/capsule 12.5 mg, 12.5 mg, Oral, Q8HRS PRN, Jayy Reid M.D.  [unfilled]    Most Recent Vital Signs:  /73   Pulse 67   Temp 36.7 °C (98.1 °F) (Temporal)   Resp 18   Ht 1.727 m (5' 8\")   Wt 75.4 kg (166 lb 3.6 oz)   SpO2 99%   BMI 25.27 kg/m²   Temp  Av.2 °C (98.9 °F)  Min: 36.3 °C (97.3 °F)  " Max: 38.3 °C (100.9 °F)    Physical Exam:  Physical Exam   Constitutional: He is oriented to person, place, and time and well-developed, well-nourished, and in no distress.   HENT:   Head: Normocephalic and atraumatic.   Cardiovascular: Normal rate, regular rhythm and normal heart sounds.    Pulmonary/Chest: Effort normal.   Faint crackles bilaterally   Abdominal: Soft. Bowel sounds are normal. He exhibits no distension. There is no tenderness. There is no rebound.   Musculoskeletal: Normal range of motion. He exhibits edema.   Neurological: He is alert and oriented to person, place, and time.   Skin: Skin is warm and dry.   Psychiatric: Affect and judgment normal.         Pertinent Lab Results:  Recent Labs      02/02/19   0557  02/03/19   0245   WBC  11.9*  7.5      Recent Labs      02/02/19   0557  02/03/19   0245   HEMOGLOBIN  14.1  12.2*   HEMATOCRIT  39.5*  34.9*   MCV  105.1*  106.7*   MCH  37.5*  37.3*   PLATELETCT  189  158*         Recent Labs      02/02/19   0557  02/03/19   0245   SODIUM  134*  137   POTASSIUM  4.4  3.8   CHLORIDE  102  106   CO2  23  24   CREATININE  0.91  1.07        Recent Labs      02/02/19   0557  02/03/19   0245   ALBUMIN  4.1  3.1*        Pertinent Micro:  Results     Procedure Component Value Units Date/Time    URINE CULTURE(NEW) [[id number]] Collected:  02/02/19 0705    Order Status:  Completed Specimen:  Urine from Urine, Clean Catch Updated:  02/04/19 0837     Significant Indicator NEG     Source UR     Site URINE, CLEAN CATCH     Urine Culture No growth at 48 hours    Narrative:       Indication for culture:->Emergency Room Patient    BLOOD CULTURE [[id number]] Collected:  02/02/19 0630    Order Status:  Completed Specimen:  Blood from Peripheral Updated:  02/03/19 0855     Significant Indicator NEG     Source BLD     Site PERIPHERAL     Blood Culture No Growth    Note: Blood cultures are incubated for 5 days and  are monitored continuously.Positive blood cultures  are called  "to the RN and reported as soon as  they are identified.      Narrative:       Per Hospital Policy: Only change Specimen Src: to \"Line\" if  specified by physician order.    BLOOD CULTURE [[id number]] Collected:  02/02/19 0620    Order Status:  Completed Specimen:  Blood from Peripheral Updated:  02/03/19 0855     Significant Indicator NEG     Source BLD     Site PERIPHERAL     Blood Culture No Growth    Note: Blood cultures are incubated for 5 days and  are monitored continuously.Positive blood cultures  are called to the RN and reported as soon as  they are identified.      Narrative:       Per Hospital Policy: Only change Specimen Src: to \"Line\" if  specified by physician order.    Influenza A/B By PCR [[id number]] Collected:  02/02/19 0640    Order Status:  Completed Specimen:  Nasal from Nasopharyngeal Updated:  02/02/19 0720     Influenza virus A RNA Negative     Influenza virus B, PCR Negative    Narrative:       Indication for culture:->Emergency Room Patient    URINALYSIS [[id number]]  (Abnormal) Collected:  02/02/19 0705    Order Status:  Completed Specimen:  Urine from Urine, Clean Catch Updated:  02/02/19 0715     Color Yellow     Character Clear     Specific Gravity 1.012     Ph 7.5     Glucose Negative mg/dL      Ketones Negative mg/dL      Protein 100 (A) mg/dL      Bilirubin Negative     Urobilinogen, Urine 0.2     Nitrite Negative     Leukocyte Esterase Negative     Occult Blood Moderate (A)     Micro Urine Req Microscopic    Narrative:       Indication for culture:->Emergency Room Patient        Blood Culture   Date Value Ref Range Status   02/02/2019   Preliminary    No Growth    Note: Blood cultures are incubated for 5 days and  are monitored continuously.Positive blood cultures  are called to the RN and reported as soon as  they are identified.          Studies:  Ct-head W/o    Result Date: 2/2/2019 2/2/2019 6:37 AM HISTORY/REASON FOR EXAM: Altered level of consciousness (LOC), unexplained; on " coumadin , head injury, ALOC TECHNIQUE/EXAM DESCRIPTION:  CT of the head without contrast. Sequential axial images were obtained from the vertex to the skull base without contrast. Up to date radiation dose reduction adjustments have been utilized to meet ALARA standards for radiation dose reduction. COMPARISON:  December 24, 2012 FINDINGS: There is mild diffuse parenchymal volume loss observed. Periventricular and subcortical white matter low-attenuation changes are seen, most commonly associated with small vessel ischemic disease. There is mild bilateral symmetric ventricular dilatation seen, with appearance likely representing ex vacuo dilatation. No space occupying lesions or areas of acute vascular territory infarctions are identified. There are no abnormal extra axial fluid collections or extra axial hemorrhage identified. The visualized paranasal sinuses and mastoid air cells are well aerated bilaterally. No depressed calvarial fractures are identified. The visualized globes and retrobulbar soft tissues appear within normal limits.  Atherosclerotic intracranial calcifications are seen.     1.  No acute intracranial abnormality is identified, there are nonspecific white matter changes, commonly associated with small vessel ischemic disease.  Associated mild cerebral atrophy is noted. 2.  Atherosclerosis.    Dx-chest-portable (1 View)    Result Date: 2/2/2019 2/2/2019 6:34 AM HISTORY/REASON FOR EXAM: Shortness of breath TECHNIQUE/EXAM DESCRIPTION:  Single AP view of the chest. COMPARISON: December 24, 2012 FINDINGS: Overlying cardiac leads are present. Cardiomegaly is observed. Atherosclerotic calcification of the aorta is noted.  The central  pulmonary vasculature appears prominent and indistinct. The lungs appear well expanded bilaterally.  Diffuse scattered hazy pulmonary parenchymal opacities are seen. No significant pleural effusions are identified. The bony structures appear age-appropriate.     1.   Mild pulmonary edema and/or infiltrates 2.  Cardiomegaly 3.  Atherosclerosis    Ec-echocardiogram Complete W/o Cont    Result Date: 2/3/2019  Transthoracic Echo Report Echocardiography Laboratory CONCLUSIONS No prior study is available for comparison. Technically difficult study - adequate information is obtained. Hyperdynamic left ventricular systolic function.  Left ventricular ejection fraction is visually estimated to be greater than 75%. Normal inferior vena cava size and inspiratory collapse.  Moderately dilated left atrium. Mild calcific aortic stenosis. MANI NÚÑEZ Exam Date:         2019                    08:09 Exam Location:     Inpatient Priority:          Routine Ordering Physician:        SE CARPENTER Referring Physician:       [id number] Maldonado Sonographer:               Mani Coburn RDCS Age:    89     Gender:    M MRN:    [id number] :    [id number] BSA:    1.88   Ht (in):    68     Wt (lb):    165 Exam Type:     Complete Indications:     Shortness of breath ICD Codes:       786.05 CPT Codes:       06592 BP:   152    /   74     HR: Technical Quality:       Technically difficult study -                          adequate information is obtained MEASUREMENTS  (Male / Female) Normal Values 2D ECHO LV Diastolic Diameter PLAX        4.5 cm                4.2 - 5.9 / 3.9 - 5.3 cm LV Systolic Diameter PLAX         2.6 cm                2.1 - 4.0 cm IVS Diastolic Thickness           0.99 cm               LVPW Diastolic Thickness          0.99 cm               LVOT Diameter                     2.2 cm                Estimated LV Ejection Fraction    80 %                  LV Ejection Fraction MOD BP       74.1 %                >= 55  % LV Ejection Fraction MOD 4C       71.3 %                LV Ejection Fraction MOD 2C       76.1 %                DOPPLER AV Peak Velocity                  2 m/s                 AV Peak Gradient                  15.9 mmHg             AV Mean Gradient                  9.1 mmHg               LVOT Peak Velocity                0.9 m/s               AV Area Cont Eq vti               1.9 cm²               Mitral E Point Velocity           0.73 m/s              Mitral E to A Ratio               2.3                   Mitral A Duration                 69.2 ms               MV Pressure Half Time             54.1 ms               MV Area PHT                       4.1 cm²               MV Deceleration Time              186 ms                PV Peak Velocity                  0.94 m/s              PV Peak Gradient                  3.5 mmHg              PV Mean Gradient                  2 mmHg                * Indicates values subject to auto-interpretation LV EF:  80    % FINDINGS Left Ventricle Normal left ventricular chamber size. Normal left ventricular wall thickness. Hyperdynamic left ventricular systolic function. Left ventricular ejection fraction is visually estimated to be greater than 75%. Normal regional wall motion. Normal diastolic function. Right Ventricle Right ventricle not well visualized. Right Atrium Normal right atrial size. Normal inferior vena cava size and inspiratory collapse. Left Atrium Moderately dilated left atrium. Left atrial volume index is 44  mL/sq m. Mitral Valve Structurally normal mitral valve. No mitral stenosis. Trace mitral regurgitation. Aortic Valve Calcified aortic valve leaflets. Mild aortic stenosis. Vmax is 2  m/s. Transvalvular gradients are - Peak: 18 mmHg, Mean: 12 mmHg. No aortic insufficiency. Tricuspid Valve The tricuspid valve is not well visualized. Pulmonic Valve The pulmonic valve is not well visualized. Pericardium Normal pericardium without effusion. Aorta Normal aortic root for body surface area. Karey Mo M.D. (Electronically Signed) Final Date:     03 February 2019                 13:45      ASSESSMENT:       Patient is a 89-year-old with a past medical history of seizures, migraines, PE on chronic anticoagulation with warfarin and  BPH.normally functions well mentally, walks 3 miles daily, minimal medications.  He had an acute change in his mental status prior to admission accompanied by shaking chills, and had noted cough and shortness of breath.  He was found to be fevers to 100.9.  Chest x-ray concerning for possible pneumonia versus pulmonary edema.  He started on broad-spectrum antibiotics and is improved markedly.  ID was consulted due to concern for encephalitis.  However the patient's no status is improved and he has refused LP.      Encephalopathy- markedly improved   Community-acquired pneumonia  Febrile to 100.9 on admission, now improved  Hypoxic on 2 L nasal cannula      PLAN:     -Patient's rapid improvement, lack of headache or meningeal signs, and not indicative of encephalitis.  Will stop acyclovir, stop ampicillin and stop vancomycin.   -Patient possible pneumonia, no other clear source of his presentation, all cultures are negative.   -Continue ceftriaxone while inpatient can transition to levofloxacin 750 mg daily on discharge to complete a total antibiotic course of 5 days    ID will sign off.       Plan of care discussed with IM.     Sravani Haynes M.D.

## 2019-02-04 NOTE — THERAPY
"Occupational Therapy Evaluation completed.   Functional Status: Pt is a 89 y/o male admitted with AMS found to have pneumonia and sepsis, in which he also had a fall at home. Today he was pleasant and cooperative, however with impaired safety awareness and impaired insight into deficits. He needed max cueing at times for walker safety. He also had difficulty sequencing certain tasks such as a sponge bath at the sink. He needed Triny for toileting and LB dressing. He lost his balance multiple times in the bathroom when utilizing BUE for tasks. He did seem to have improved balance during functional mobility without any obstacles and using FWW. He is currently limited by weakness, fatigue, impaired balance, and impaired safety awareness which impacts independence in ADLs and functional mobility.  Plan of Care: Will benefit from Occupational Therapy 3 times per week  Discharge Recommendations:  Equipment: Front-Wheel Walker, Shower Chair and Will Continue to Assess for Equipment Needs. At this time, Recommend inpatient transitional care services for continued occupational therapy services. Will follow closely.      See \"Rehab Therapy-Acute\" Patient Summary Report for complete documentation.    "

## 2019-02-04 NOTE — CONSULTS
ID consult requested by Dr. Lewis for possible meningitis  Chart reviewed. Pt NOT yet seen  89 yo man presented with altered mental status  CXR show possible pneumonia  Bcx NGTD and urine cx neg  Refusing LP as he has done in the past  Continue current abx for now  Full consult to follow

## 2019-02-04 NOTE — CARE PLAN
Problem: Safety  Goal: Will remain free from falls  Outcome: PROGRESSING SLOWER THAN EXPECTED  Pt is HIGH fall risk. Appropriate safety precautions in place including bed/strip alarm, bed in low/locked position, treaded footwear, upper side rails up and call light in reach. Pt educated on fall precautions yet was not receptive to education. Pt agitated and impulsive at times. Education provided to wife who is present at bedside. Wife verbalized understanding of safety/fall precautions.     Problem: Bowel/Gastric:  Goal: Normal bowel function is maintained or improved  Outcome: PROGRESSING AS EXPECTED   02/03/19 2000   OTHER   Last BM 02/03/19   Number of Times Stooled 1   Pt having regular bowel movements and denies any issues with bowel function.

## 2019-02-04 NOTE — PROGRESS NOTES
Bedside report received, assumed care of pt. Pt sitting in chair, awake and alert, yet disoriented to time and place. Pt aware he is at the hospital but unsure of which one. Pt's wife at bedside. Pt is [place] and does not have hearing aides available at this time. POC discussed and pt/family questions answered. Tele box on, safety precautions in place including bed/strip alarm. Pt denies pain yet verbalized a need to use restroom for a BM. RN assisted pt to bathroom with walker. Pt agitated that RN provided assistance and would not leave pt unattended in bathroom. Pt educated on safety precautions and reasons why he could not be left unattended as he is a fall risk. Pt became increasingly agitated, but eventually verbalized understanding of safety precautions. Pt educated on use of call light.

## 2019-02-04 NOTE — PROGRESS NOTES
Received bedside report from RN, pt care assumed, VSS, pt assessment complete. Pt AAOx3, pt doesn't c/o pain at this time. No signs of acute distress noted at this time. POC discussed with pt and verbalizes no questions. Pt denies any additional needs at this time. Bed in lowest position, bed alarm on, pt educated on fall risk and verbalized understanding, call light within reach, hourly rounding initiated. Wife at bedside.

## 2019-02-05 ENCOUNTER — PATIENT OUTREACH (OUTPATIENT)
Dept: HEALTH INFORMATION MANAGEMENT | Facility: OTHER | Age: 84
End: 2019-02-05

## 2019-02-05 PROBLEM — R53.81 DEBILITY: Status: ACTIVE | Noted: 2019-02-05

## 2019-02-05 PROBLEM — J18.9 PNEUMONIA: Status: ACTIVE | Noted: 2019-02-05

## 2019-02-05 LAB
INR PPP: 2.83 (ref 0.87–1.13)
PROTHROMBIN TIME: 29.8 SEC (ref 12–14.6)

## 2019-02-05 PROCEDURE — 36415 COLL VENOUS BLD VENIPUNCTURE: CPT

## 2019-02-05 PROCEDURE — 99232 SBSQ HOSP IP/OBS MODERATE 35: CPT | Performed by: HOSPITALIST

## 2019-02-05 PROCEDURE — 700101 HCHG RX REV CODE 250: Performed by: INTERNAL MEDICINE

## 2019-02-05 PROCEDURE — 700111 HCHG RX REV CODE 636 W/ 250 OVERRIDE (IP): Performed by: INTERNAL MEDICINE

## 2019-02-05 PROCEDURE — 700102 HCHG RX REV CODE 250 W/ 637 OVERRIDE(OP): Performed by: INTERNAL MEDICINE

## 2019-02-05 PROCEDURE — 85610 PROTHROMBIN TIME: CPT

## 2019-02-05 PROCEDURE — A9270 NON-COVERED ITEM OR SERVICE: HCPCS | Performed by: INTERNAL MEDICINE

## 2019-02-05 PROCEDURE — 94640 AIRWAY INHALATION TREATMENT: CPT

## 2019-02-05 PROCEDURE — A9270 NON-COVERED ITEM OR SERVICE: HCPCS | Performed by: HOSPITALIST

## 2019-02-05 PROCEDURE — 700105 HCHG RX REV CODE 258: Performed by: INTERNAL MEDICINE

## 2019-02-05 PROCEDURE — 700102 HCHG RX REV CODE 250 W/ 637 OVERRIDE(OP): Performed by: HOSPITALIST

## 2019-02-05 PROCEDURE — 97112 NEUROMUSCULAR REEDUCATION: CPT

## 2019-02-05 PROCEDURE — 770020 HCHG ROOM/CARE - TELE (206)

## 2019-02-05 PROCEDURE — 97530 THERAPEUTIC ACTIVITIES: CPT

## 2019-02-05 RX ORDER — WARFARIN SODIUM 2.5 MG/1
1.25 TABLET ORAL
Status: DISCONTINUED | OUTPATIENT
Start: 2019-02-07 | End: 2019-02-06 | Stop reason: HOSPADM

## 2019-02-05 RX ORDER — WARFARIN SODIUM 2.5 MG/1
2.5 TABLET ORAL
Status: DISCONTINUED | OUTPATIENT
Start: 2019-02-06 | End: 2019-02-06 | Stop reason: HOSPADM

## 2019-02-05 RX ORDER — WARFARIN SODIUM 2.5 MG/1
1.25 TABLET ORAL
Status: COMPLETED | OUTPATIENT
Start: 2019-02-05 | End: 2019-02-05

## 2019-02-05 RX ADMIN — Medication 1 CAPSULE: at 08:22

## 2019-02-05 RX ADMIN — CEFTRIAXONE SODIUM 1 G: 1 INJECTION, POWDER, FOR SOLUTION INTRAMUSCULAR; INTRAVENOUS at 05:58

## 2019-02-05 RX ADMIN — ALBUTEROL SULFATE 2.5 MG: 2.5 SOLUTION RESPIRATORY (INHALATION) at 15:52

## 2019-02-05 RX ADMIN — WARFARIN SODIUM 1.25 MG: 2.5 TABLET ORAL at 17:39

## 2019-02-05 RX ADMIN — TRIAZOLAM 0.5 MG: 0.25 TABLET ORAL at 20:22

## 2019-02-05 ASSESSMENT — GAIT ASSESSMENTS
ASSISTIVE DEVICE: FRONT WHEEL WALKER
GAIT LEVEL OF ASSIST: CONTACT GUARD ASSIST
DISTANCE (FEET): 250

## 2019-02-05 ASSESSMENT — ENCOUNTER SYMPTOMS
BLOOD IN STOOL: 0
BRUISES/BLEEDS EASILY: 0
FLANK PAIN: 0
SHORTNESS OF BREATH: 1
SEIZURES: 0
COUGH: 1
HEMOPTYSIS: 0
DEPRESSION: 0
NERVOUS/ANXIOUS: 1
MYALGIAS: 0
EYE DISCHARGE: 0
NECK PAIN: 0
PALPITATIONS: 0
FALLS: 0
ABDOMINAL PAIN: 0
SORE THROAT: 0
VOMITING: 0
EYE REDNESS: 0
HALLUCINATIONS: 0
FEVER: 0
WEAKNESS: 0
EYE PAIN: 0
BACK PAIN: 0
STRIDOR: 0
TREMORS: 0
SENSORY CHANGE: 0
CHILLS: 0

## 2019-02-05 ASSESSMENT — COGNITIVE AND FUNCTIONAL STATUS - GENERAL
SUGGESTED CMS G CODE MODIFIER MOBILITY: CK
TURNING FROM BACK TO SIDE WHILE IN FLAT BAD: A LITTLE
MOVING TO AND FROM BED TO CHAIR: A LITTLE
WALKING IN HOSPITAL ROOM: A LITTLE
MOVING FROM LYING ON BACK TO SITTING ON SIDE OF FLAT BED: A LITTLE
CLIMB 3 TO 5 STEPS WITH RAILING: A LOT
STANDING UP FROM CHAIR USING ARMS: A LITTLE
MOBILITY SCORE: 17

## 2019-02-05 NOTE — DISCHARGE INSTRUCTIONS
Discharge Instructions    Discharged to home by car with relative. Discharged via wheelchair, hospital escort: Yes.  Special equipment needed: Oxygen    Be sure to schedule a follow-up appointment with your primary care doctor or any specialists as instructed.     Discharge Plan:   Diet Plan: Discussed  Activity Level: Discussed  Confirmed Symptoms Management: Discussed  Medication Reconciliation Updated: Yes  Pneumococcal Vaccine Administered/Refused: Given (See MAR)  Influenza Vaccine Indication: Patient Refuses  Influenza Vaccine Given - only chart on this line when given: Influenza Vaccine Given (See MAR)    I understand that a diet low in cholesterol, fat, and sodium is recommended for good health. Unless I have been given specific instructions below for another diet, I accept this instruction as my diet prescription.   Other diet: Cardiac, low sodium diet    Special Instructions: None    · Is patient discharged on Warfarin / Coumadin?   Yes    You are receiving the drug warfarin. Please understand the importance of monitoring warfarin with scheduled PT/INR blood draws.  Follow-up with a call to your personal Doctor's office in 3 days to schedule a PT/INR. .    IMPORTANT: HOW TO USE THIS INFORMATION:  This is a summary and does NOT have all possible information about this product. This information does not assure that this product is safe, effective, or appropriate for you. This information is not individual medical advice and does not substitute for the advice of your health care professional. Always ask your health care professional for complete information about this product and your specific health needs.      WARFARIN - ORAL (WARF-uh-rin)      COMMON BRAND NAME(S): Coumadin      WARNING:  Warfarin can cause very serious (possibly fatal) bleeding. This is more likely to occur when you first start taking this medication or if you take too much warfarin. To decrease your risk for bleeding, your doctor or  "other health care provider will monitor you closely and check your lab results (INR test) to make sure you are not taking too much warfarin. Keep all medical and laboratory appointments. Tell your doctor right away if you notice any signs of serious bleeding. See also Side Effects section.      USES:  This medication is used to treat blood clots (such as in deep vein thrombosis-DVT or pulmonary embolus-PE) and/or to prevent new clots from forming in your body. Preventing harmful blood clots helps to reduce the risk of a stroke or heart attack. Conditions that increase your risk of developing blood clots include a certain type of irregular heart rhythm (atrial fibrillation), heart valve replacement, recent heart attack, and certain surgeries (such as hip/knee replacement). Warfarin is commonly called a \"blood thinner,\" but the more correct term is \"anticoagulant.\" It helps to keep blood flowing smoothly in your body by decreasing the amount of certain substances (clotting proteins) in your blood.      HOW TO USE:  Read the Medication Guide provided by your pharmacist before you start taking warfarin and each time you get a refill. If you have any questions, ask your doctor or pharmacist. Take this medication by mouth with or without food as directed by your doctor or other health care professional, usually once a day. It is very important to take it exactly as directed. Do not increase the dose, take it more frequently, or stop using it unless directed by your doctor. Dosage is based on your medical condition, laboratory tests (such as INR), and response to treatment. Your doctor or other health care provider will monitor you closely while you are taking this medication to determine the right dose for you. Use this medication regularly to get the most benefit from it. To help you remember, take it at the same time each day. It is important to eat a balanced, consistent diet while taking warfarin. Some foods can " affect how warfarin works in your body and may affect your treatment and dose. Avoid sudden large increases or decreases in your intake of foods high in vitamin K (such as broccoli, cauliflower, cabbage, brussels sprouts, kale, spinach, and other green leafy vegetables, liver, green tea, certain vitamin supplements). If you are trying to lose weight, check with your doctor before you try to go on a diet. Cranberry products may also affect how your warfarin works. Limit the amount of cranberry juice (16 ounces/480 milliliters a day) or other cranberry products you may drink or eat.      SIDE EFFECTS:  Nausea, loss of appetite, or stomach/abdominal pain may occur. If any of these effects persist or worsen, tell your doctor or pharmacist promptly. Remember that your doctor has prescribed this medication because he or she has judged that the benefit to you is greater than the risk of side effects. Many people using this medication do not have serious side effects. This medication can cause serious bleeding if it affects your blood clotting proteins too much (shown by unusually high INR lab results). Even if your doctor stops your medication, this risk of bleeding can continue for up to a week. Tell your doctor right away if you have any signs of serious bleeding, including: unusual pain/swelling/discomfort, unusual/easy bruising, prolonged bleeding from cuts or gums, persistent/frequent nosebleeds, unusually heavy/prolonged menstrual flow, pink/dark urine, coughing up blood, vomit that is bloody or looks like coffee grounds, severe headache, dizziness/fainting, unusual or persistent tiredness/weakness, bloody/black/tarry stools, chest pain, shortness of breath, difficulty swallowing. Tell your doctor right away if any of these unlikely but serious side effects occur: persistent nausea/vomiting, severe stomach/abdominal pain, yellowing eyes/skin. This drug rarely has caused very serious (possibly fatal) problems if its  effects lead to small blood clots (usually at the beginning of treatment). This can lead to severe skin/tissue damage that may require surgery or amputation if left untreated. Patients with certain blood conditions (protein C or S deficiency) may be at greater risk. Get medical help right away if any of these rare but serious side effects occur: painful/red/purplish patches on the skin (such as on the toe, breast, abdomen), change in the amount of urine, vision changes, confusion, slurred speech, weakness on one side of the body. A very serious allergic reaction to this drug is rare. However, get medical help right away if you notice any symptoms of a serious allergic reaction, including: rash, itching/swelling (especially of the face/tongue/throat), severe dizziness, trouble breathing. This is not a complete list of possible side effects. If you notice other effects not listed above, contact your doctor or pharmacist. In the [place] - Call your doctor for medical advice about side effects. You may report side effects to FDA at 1-049-ZMI-7478. In [place] - Call your doctor for medical advice about side effects. You may report side effects to [facility] [place] at 1-651.348.1255.      PRECAUTIONS:  Before taking warfarin, tell your doctor or pharmacist if you are allergic to it; or if you have any other allergies. This product may contain inactive ingredients, which can cause allergic reactions or other problems. Talk to your pharmacist for more details. Before using this medication, tell your doctor or pharmacist your medical history, especially of: blood disorders (such as anemia, hemophilia), bleeding problems (such as bleeding of the stomach/intestines, bleeding in the brain), blood vessel disorders (such as aneurysms), recent major injury/surgery, liver disease, alcohol use, mental/mood disorders (including memory problems), frequent falls/injuries. It is important that all your doctors and dentists know that you take  warfarin. Before having surgery or any medical/dental procedures, tell your doctor or dentist that you are taking this medication and about all the products you use (including prescription drugs, nonprescription drugs, and herbal products). Avoid getting injections into the muscles. If you must have an injection into a muscle (for example, a flu shot), it should be given in the arm. This way, it will be easier to check for bleeding and/or apply pressure bandages. This medication may cause stomach bleeding. Daily use of alcohol while using this medicine will increase your risk for stomach bleeding and may also affect how this medication works. Limit or avoid alcoholic beverages. If you have not been eating well, if you have an illness or infection that causes fever, vomiting, or diarrhea for more than 2 days, or if you start using any antibiotic medications, contact your doctor or pharmacist immediately because these conditions can affect how warfarin works. This medication can cause heavy bleeding. To lower the chance of getting cut, bruised, or injured, use great caution with sharp objects like safety razors and nail cutters. Use an electric razor when shaving and a soft toothbrush when brushing your teeth. Avoid activities such as contact sports. If you fall or injure yourself, especially if you hit your head, call your doctor immediately. Your doctor may need to check you. The Food & Drug Administration has stated that generic warfarin products are interchangeable. However, consult your doctor or pharmacist before switching warfarin products. Be careful not to take more than one medication that contains warfarin unless specifically directed by the doctor or health care provider who is monitoring your warfarin treatment. Older adults may be at greater risk for bleeding while using this drug. This medication is not recommended for use during pregnancy because of serious (possibly fatal) harm to an unborn baby.  "Discuss the use of reliable forms of birth control with your doctor. If you become pregnant or think you may be pregnant, tell your doctor immediately. If you are planning pregnancy, discuss a plan for managing your condition with your doctor before you become pregnant. Your doctor may switch the type of medication you use during pregnancy. Very small amounts of this medication may pass into breast milk but is unlikely to harm a nursing infant. Consult your doctor before breast-feeding.      DRUG INTERACTIONS:  Drug interactions may change how your medications work or increase your risk for serious side effects. This document does not contain all possible drug interactions. Keep a list of all the products you use (including prescription/nonprescription drugs and herbal products) and share it with your doctor and pharmacist. Do not start, stop, or change the dosage of any medicines without your doctor's approval. Warfarin interacts with many prescription, nonprescription, vitamin, and herbal products. This includes medications that are applied to the skin or inside the vagina or rectum. The interactions with warfarin usually result in an increase or decrease in the \"blood-thinning\" (anticoagulant) effect. Your doctor or other health care professional should closely monitor you to prevent serious bleeding or clotting problems. While taking warfarin, it is very important to tell your doctor or pharmacist of any changes in medications, vitamins, or herbal products that you are taking. Some products that may interact with this drug include: capecitabine, imatinib, mifepristone. Aspirin, aspirin-like drugs (salicylates), and nonsteroidal anti-inflammatory drugs (NSAIDs such as ibuprofen, naproxen, celecoxib) may have effects similar to warfarin. These drugs may increase the risk of bleeding problems if taken during treatment with warfarin. Carefully check all prescription/nonprescription product labels (including drugs " applied to the skin such as pain-relieving creams) since the products may contain NSAIDs or salicylates. Talk to your doctor about using a different medication (such as acetaminophen) to treat pain/fever. Low-dose aspirin and related drugs (such as clopidogrel, ticlopidine) should be continued if prescribed by your doctor for specific medical reasons such as heart attack or stroke prevention. Consult your doctor or pharmacist for more details. Many herbal products interact with warfarin. Tell your doctor before taking any herbal products, especially bromelains, coenzyme Q10, cranberry, danshen, dong quai, fenugreek, garlic, ginkgo biloba, ginseng, and Renata's wort, among others. This medication may interfere with a certain laboratory test to measure theophylline levels, possibly causing false test results. Make sure laboratory personnel and all your doctors know you use this drug.      OVERDOSE:  If overdose is suspected, contact a poison control center or emergency room immediately.  residents can call the [employer] Poison Hotline at 1-536.645.6467. [place] residents can call a provincial poison control center. Symptoms of overdose may include: bloody/black/tarry stools, pink/dark urine, unusual/prolonged bleeding.      NOTES:  Do not share this medication with others. Laboratory and/or medical tests (such as INR, complete blood count) must be performed periodically to monitor your progress or check for side effects. Consult your doctor for more details.      MISSED DOSE:  For the best possible benefit, do not miss any doses. If you do miss a dose and remember on the same day, take it as soon as you remember. If you remember on the next day, skip the missed dose and resume your usual dosing schedule. Do not double the dose to catch up because this could increase your risk for bleeding. Keep a record of missed doses to give to your doctor or pharmacist. Contact your doctor or pharmacist if you miss 2 or more  doses in a row.      STORAGE:  Store at room temperature away from light and moisture. Do not store in the bathroom. Keep all medications away from children and pets. Do not flush medications down the toilet or pour them into a drain unless instructed to do so. Properly discard this product when it is  or no longer needed. Consult your pharmacist or local waste disposal company for more details about how to safely discard your product.      MEDICAL ALERT:  Your condition and medication can cause complications in a medical emergency. For information about enrolling in MedicAlert, call 1-670.476.4251 ([place]) or 1-692.519.5573 ([place]).      Information last revised 2010 Copyright(c)  [employer]             Depression / Suicide Risk    As you are discharged from this [facility], it is important to learn how to keep safe from harming yourself.    Recognize the warning signs:  · Abrupt changes in personality, positive or negative- including increase in energy   · Giving away possessions  · Change in eating patterns- significant weight changes-  positive or negative  · Change in sleeping patterns- unable to sleep or sleeping all the time   · Unwillingness or inability to communicate  · Depression  · Unusual sadness, discouragement and loneliness  · Talk of wanting to die  · Neglect of personal appearance   · Rebelliousness- reckless behavior  · Withdrawal from people/activities they love  · Confusion- inability to concentrate     If you or a loved one observes any of these behaviors or has concerns about self-harm, here's what you can do:  · Talk about it- your feelings and reasons for harming yourself  · Remove any means that you might use to hurt yourself (examples: pills, rope, extension cords, firearm)  · Get professional help from the community (Mental Health, Substance Abuse, psychological counseling)  · Do not be alone:Call your Safe Contact- someone whom you trust who will be  there for you.  · Call your local CRISIS HOTLINE 451-7953 or 491-869-7450  · Call your local Children's Mobile Crisis Response Team [place] (962) 528-8098 or www.[employer]  · Call the toll free National Suicide Prevention Hotlines   · National Suicide Prevention Lifeline 189-722-BFAW (5334)  · National Hope Line Network 800-SUICIDE (772-0581)        Community-Acquired Pneumonia, Adult  Introduction  Pneumonia is an infection of the lungs. One type of pneumonia can happen while a person is in a hospital. A different type can happen when a person is not in a hospital (community-acquired pneumonia). It is easy for this kind to spread from person to person. It can spread to you if you breathe near an infected person who coughs or sneezes. Some symptoms include:  · A dry cough.  · A wet (productive) cough.  · Fever.  · Sweating.  · Chest pain.  Follow these instructions at home:  · Take over-the-counter and prescription medicines only as told by your doctor.  ¨ Only take cough medicine if you are losing sleep.  ¨ If you were prescribed an antibiotic medicine, take it as told by your doctor. Do not stop taking the antibiotic even if you start to feel better.  · Sleep with your head and neck raised (elevated). You can do this by putting a few pillows under your head, or you can sleep in a recliner.  · Do not use tobacco products. These include cigarettes, chewing tobacco, and e-cigarettes. If you need help quitting, ask your doctor.  · Drink enough water to keep your pee (urine) clear or pale yellow.  A shot (vaccine) can help prevent pneumonia. Shots are often suggested for:  · People older than 65 years of age.  · People older than 19 years of age:  ¨ Who are having cancer treatment.  ¨ Who have long-term (chronic) lung disease.  ¨ Who have problems with their body's defense system (immune system).  You may also prevent pneumonia if you take these actions:  · Get the flu (influenza) shot every year.  · Go to  the dentist as often as told.  · Wash your hands often. If soap and water are not available, use hand .  Contact a doctor if:  · You have a fever.  · You lose sleep because your cough medicine does not help.  Get help right away if:  · You are short of breath and it gets worse.  · You have more chest pain.  · Your sickness gets worse. This is very serious if:  ¨ You are an older adult.  ¨ Your body's defense system is weak.  · You cough up blood.  This information is not intended to replace advice given to you by your health care provider. Make sure you discuss any questions you have with your health care provider.  Document Released: 06/05/2009 Document Revised: 05/25/2017 Document Reviewed: 04/13/2016  © 2017 [employer]      Warfarin   Warfarin is a blood thinner (anticoagulant) medicine. It is used to thin the blood so blood clots do not form. This medicine may cause very bad bleeding. You may also bruise easily while on this medicine. You may also bleed a little longer if you cut yourself.   Before taking warfarin, tell your doctor if:  · You take any other medicine for your heart or blood pressure.  · You are pregnant.  · You plan to get pregnant.  · You are breastfeeding.  · You are allergic to any medicine.  HOME CARE  · Have your blood checked as told by your doctor. Do not miss visits. Blood tests will include the PT and INR tests. These tests measure how long it takes for a clot to form in a blood sample. The test results help your doctor change your dose of warfarin if needed. If you miss your blood test visits, you could have bad bleeding or blood clots.  · Take warfarin exactly as told by your doctor. If you do not, bleeding or blood clots could lead to lasting injury, pain, or disability.  · Take your medicine at the same time every day. Do not miss a dose.  · Tell your doctor about all medicine you take. This includes medicines, vitamins, natural products, or dietary pills (supplements).  Certain medicines are not safe to take while taking warfarin. Taking other medicines while taking warfarin can affect your PT and INR test results.  · Do not start or stop any medicine unless you have been told to do so by your doctor.  · Do not take anything that has aspirin in it unless your doctor says it is okay.  · Eat the same amount of vitamin K foods every day. Vitamin K foods can change how warfarin works and your PT and INR test results.    · High vitamin K foods: Beef liver. Pork liver. Green tea. Broccoli. [place] sprouts. Cauliflower. Chickpeas. Kale. Spinach. Turnip greens.  · Medium vitamin K foods: Chicken liver. Pork tenderloin. Cheddar cheese. Rolled oats. Coffee. Asparagus. Cabbage. Iceberg lettuce.  · Low vitamin K foods: Apples. Butter. Bananas. Skim or 1% milk. Canned pears. White bread. Strawberries. Corn. Tomatoes. Green beans. Eggs. Potatoes. [place]. Pumpkin. Chicken breasts. Ground beef. Oil (except soybean oil).  · Avoid making major changes to your normal diet. Talk to your doctor first before changing your normal diet.  · Do not drink alcohol.  · Tell your doctors and dentists that you are taking warfarin at the beginning of every visit.  GET HELP RIGHT AWAY IF:  · You miss a dose of warfarin. Do not take 2 doses at the same time.  · You have a skin rash.  · You have heavy or unusual bleeding.  · There is blood in your pee (urine) or poop (stool).  · You have side effects from medicine that do not get better after a few days.  MAKE SURE YOU:  · Understand these instructions.  · Will watch your condition.  · Will get help right away if you are not doing well or get worse.  Document Released: 01/20/2012 Document Revised: 06/18/2013 Document Reviewed: 01/20/2012  [employer]® Patient Information ©2014 [employer].    Oxygen Use at Home  Oxygen can be prescribed for home use. The prescription will show the flow rate. This is how much oxygen is to be used per minute. This will be listed in  liters per minute (LPM or L/M). A liter is a metric measurement of volume.  You will use oxygen therapy as directed. It can be used while exercising, sleeping, or at rest. You may need oxygen continuously. Your health care provider may order a blood oxygen test (arterial blood gas or pulse oximetry test) that will show what your oxygen level is. Your health care provider will use these measurements to learn about your needs and follow your progress.  Home oxygen therapy is commonly used on patients with various lung (pulmonary) related conditions. Some of these conditions include:  · Asthma.  · Lung cancer.  · Pneumonia.  · Emphysema.  · Chronic bronchitis.  · Cystic fibrosis.  · Other lung diseases.  · Pulmonary fibrosis.  · Occupational lung disease.  · Heart failure.  · Chronic obstructive pulmonary disease (COPD).  3 COMMON WAYS OF PROVIDING OXYGEN THERAPY  · Gas: The gas form of oxygen is put into variously sized cylinders or tanks. The cylinders or oxygen tanks contain compressed oxygen. The cylinder is equipped with a regulator that controls the flow rate. Because the flow of oxygen out of the cylinder is constant, an oxygen conserving device may be attached to the system to avoid waste. This device releases the gas only when you inhale and cuts it off when you exhale. Oxygen can be provided in a small cylinder that can be carried with you. Large tanks are heavy and are only for stationary use. After use, empty tanks must be exchanged for full tanks.  · Liquid: The liquid form of oxygen is put into a container similar to a thermos. When released, the liquid converts to a gas and you breathe it in just like the compressed gas. This storage method takes up less space than the compressed gas cylinder, and you can transfer the liquid to a small, portable vessel at home. Liquid oxygen is more expensive than the compressed gas, and the vessel vents when not in use. An oxygen conserving device may be built into the  "vessel to conserve the oxygen. Liquid oxygen is very cold, around 297° below zero.  · Oxygen concentrator: This medical device filters oxygen from room air and gives almost 100% oxygen to the patient. Oxygen concentrators are powered by electricity. Benefits of this system are:  ¨ It does not need to be resupplied.  ¨ It is not as costly as liquid oxygen.  ¨ Extra tubing permits the user to move around easier.  There are several types of small, portable oxygen systems available which can help you remain active and mobile. You must have a cylinder of oxygen as a backup in the event of a power failure. Advise your electric power company that you are on oxygen therapy in order to get priority service when there is a power failure.  OXYGEN DELIVERY DEVICES  There are 3 common ways to deliver oxygen to your body.  · Nasal cannula. This is a 2-pronged device inserted in the nostrils that is connected to tubing carrying the oxygen. The tubing can rest on the ears or be attached to the frame of eyeglasses.  · Mask. People who need a high flow of oxygen generally use a mask.  · Transtracheal catheter. Transtracheal oxygen therapy requires the insertion of a small, flexible tube (catheter) in the windpipe (trachea). This catheter is held in place by a necklace. Since transtracheal oxygen bypasses the mouth, nose, and throat, a humidifier is absolutely required at flow rates of 1 LPM or greater.  OXYGEN USE SAFETY TIPS  · Never smoke while using oxygen. Oxygen does not burn or explode, but flammable materials will burn faster in the presence of oxygen.  · Keep a fire extinguisher close by. Let your fire department know that you have oxygen in your home.  · Warn visitors not to smoke near you when you are using oxygen. Put up \"no smoking\" signs in your home where you most often use the oxygen.  · When you go to a restaurant with your portable oxygen source, ask to be seated in the nonsmoking section.  · Stay at least 5 feet " away from gas stoves, candles, lighted fireplaces, or other heat sources.  · Do not use materials that burn easily (flammable) while using your oxygen.  · If you use an oxygen cylinder, make sure it is secured to some fixed object or in a stand. If you use liquid oxygen, make sure the vessel is kept upright to keep the oxygen from pouring out. Liquid oxygen is so cold it can hurt your skin.  · If you use an oxygen concentrator, call your electric company so you will be given priority service if your power goes out. Avoid using extension cords, if possible.  · Regularly test your smoke detectors at home to make sure they work. If you receive care in your home from a nurse or other health care provider, he or she may also check to make sure your smoke detectors work.  GUIDELINES FOR CLEANING YOUR EQUIPMENT  · Wash the nasal prongs with a liquid soap. Thoroughly rinse them once or twice a week.  · Replace the prongs every 2 to 4 weeks. If you have an infection (cold, pneumonia) change them when you are well.  · Your health care provider will give you instructions on how to clean your transtracheal catheter.  · The humidifier bottle should be washed with soap and warm water and rinsed thoroughly between each refill. Air-dry the bottle before filling it with sterile or distilled water. The bottle and its top should be disinfected after they are cleaned.  · If you use an oxygen concentrator, unplug the unit. Then wipe down the cabinet with a damp cloth and dry it daily. The air filter should be cleaned at least twice a week.  · Follow your home medical equipment and service company's directions for cleaning the compressor filter.  HOME CARE INSTRUCTIONS   · Do not change the flow of oxygen unless directed by your health care provider.  · Do not use alcohol or other sedating drugs unless instructed. They slow your breathing rate.  · Do not use materials that burn easily (flammable) while using your oxygen.  · Always keep a  spare tank of oxygen. Plan ahead for holidays when you may not be able to get a prescription filled.  · Use water-based lubricants on your lips or nostrils. Do not use an oil-based product like petroleum jelly.  · To prevent your cheeks or the skin behind your ears from becoming irritated, tuck some gauze under the tubing.  · If you have persistent redness under your nose, call your health care provider.  · When you no longer need oxygen, your doctor will have the oxygen discontinued. Oxygen is not addicting or habit forming.  · Use the oxygen as instructed. Too much oxygen can be harmful and too little will not give you the benefit you need.  · Shortness of breath is not always from a lack of oxygen. If your oxygen level is not the cause of your shortness of breath, taking oxygen will not help.  SEEK MEDICAL CARE IF:   · You have frequent headaches.  · You have shortness of breath or a lasting cough.  · You have anxiety.  · You are confused.  · You are drowsy or sleepy all the time.  · You develop an illness which aggravates your breathing.  · You cannot exercise.  · You are restless.  · You have blue lips or fingernails.  · You have difficult or irregular breathing and it is getting worse.  · You have a fever.     This information is not intended to replace advice given to you by your health care provider. Make sure you discuss any questions you have with your health care provider.     Document Released: 03/09/2005 Document Revised: 01/08/2016 Document Reviewed: 07/30/2014  [employer] Interactive Patient Education ©2016 [employer]

## 2019-02-05 NOTE — ASSESSMENT & PLAN NOTE
PT OT recommending skilled nursing facility however patient and family refusing accordingly home health was ordered.  Patient and family understanding, accepting the risk of going with home health rather than skilled nursing facility.

## 2019-02-05 NOTE — DISCHARGE PLANNING
Received Choice form at 1252  Agency/Facility Name: [facility]  Referral sent per Choice form at 1400

## 2019-02-05 NOTE — DISCHARGE PLANNING
Agency/Facility Name: [facility]  Spoke To: Amission  Outcome: Processing O2 and walker. Medicare does not cover shower chair.

## 2019-02-05 NOTE — PROGRESS NOTES
[facility] Medicine Daily Progress Note    Date of Service  2/5/2019    Chief Complaint  89 y.o. male admitted 2/2/2019 with [facility]    Hospital Course  89 y.o. male who presented 2/2/2019 with mental status change, urinary incontinence, fever, abnormal chest x-ray.  In the emergency room, pt was found to be hypotensive, febrile, with possible infiltrates on chest x-ray.  Did not appear to have urinary tract infection.    He was started on IV antibiotics, IV fluids for sepsis likely due to pneumonia. Patient has hx of encephalopathy and has refused LP in the past and was empirically started on antibiotics and antivirals for presumed meningitis.    Interval Problem Update  Heart rate 70s-80s, blood pressure within normal limits apart from one reading of high systolic blood pressure of 170  Home O2 evaluation revealed that the patient needs 4 L with ambulation   PT OT recommending skilled nursing facility however patient and family refusing accordingly home health was ordered.  Patient and family understanding, accepting the risk of going with home health rather than skilled nursing facility.  Day 4 out of 5 of antibiotics, no fevers or chills  Discussed with patient, patient's nurse and with multidisciplinary team during rounds including , pharmacist and charge nurse.     Consultants/Specialty  Infectious disease  palliative    Code Status  DNAR/DNI    Disposition  Skilled nursing facility recommended, patient and family declined want to go with home health    Review of Systems  Review of Systems   Constitutional: Negative for chills and fever.   HENT: Negative for sore throat.    Eyes: Negative for pain, discharge and redness.   Respiratory: Positive for cough (Improving) and shortness of breath (Improving). Negative for hemoptysis and stridor.    Cardiovascular: Negative for chest pain, palpitations and leg swelling.   Gastrointestinal: Negative for abdominal pain, blood in stool, melena and  vomiting.   Genitourinary: Negative for flank pain and hematuria.   Musculoskeletal: Negative for back pain, falls, joint pain, myalgias and neck pain.   Skin: Negative for itching.   Neurological: Negative for tremors, sensory change, seizures and weakness.   Endo/Heme/Allergies: Does not bruise/bleed easily.   Psychiatric/Behavioral: Negative for depression, hallucinations and suicidal ideas. The patient is nervous/anxious.         Physical Exam  Temp:  [36.7 °C (98 °F)-37.5 °C (99.5 °F)] 36.7 °C (98 °F)  Pulse:  [67-84] 77  Resp:  [17-18] 18  BP: (130-170)/(67-90) 170/90  SpO2:  [88 %-99 %] 95 %    Physical Exam   Constitutional: He is oriented to person, place, and time. He appears well-developed and well-nourished. No distress.   HENT:   Head: Normocephalic and atraumatic.   Mouth/Throat: No oropharyngeal exudate.   Eyes: Pupils are equal, round, and reactive to light. Conjunctivae are normal. No scleral icterus.   Neck: Normal range of motion. Neck supple. No thyromegaly present.   Cardiovascular: Normal rate and regular rhythm.    No murmur heard.  Pulmonary/Chest: Effort normal. No stridor. No respiratory distress. He has no wheezes. He has rales.   Reduced air entry bilaterally and lower zones   Abdominal: Soft. Bowel sounds are normal. He exhibits no distension. There is no tenderness. There is no rebound.   Musculoskeletal: Normal range of motion. He exhibits no edema or tenderness.   Lymphadenopathy:     He has no cervical adenopathy.   Neurological: He is alert and oriented to person, place, and time. He exhibits normal muscle tone.   Skin: Skin is warm and dry. No rash noted. No erythema. No pallor.   Psychiatric: He has a normal mood and affect. Judgment normal.   Nursing note and vitals reviewed.      Fluids    Intake/Output Summary (Last 24 hours) at 02/05/19 1752  Last data filed at 02/05/19 1443   Gross per 24 hour   Intake             1040 ml   Output              650 ml   Net              390 ml        Laboratory  Recent Labs      02/03/19   0245   WBC  7.5   RBC  3.27*   HEMOGLOBIN  12.2*   HEMATOCRIT  34.9*   MCV  106.7*   MCH  37.3*   MCHC  35.0   RDW  54.0*   PLATELETCT  158*   MPV  10.3     Recent Labs      02/03/19   0245   SODIUM  137   POTASSIUM  3.8   CHLORIDE  106   CO2  24   GLUCOSE  101*   BUN  16   CREATININE  1.07   CALCIUM  8.0*     Recent Labs      02/03/19   0756  02/04/19   0547  02/05/19   0214   INR  2.07*  2.62*  2.83*               Imaging  EC-ECHOCARDIOGRAM COMPLETE W/O CONT   Final Result      DX-CHEST-PORTABLE (1 VIEW)   Final Result         1.  Mild pulmonary edema and/or infiltrates   2.  Cardiomegaly   3.  Atherosclerosis      CT-HEAD W/O   Final Result         1.  No acute intracranial abnormality is identified, there are nonspecific white matter changes, commonly associated with small vessel ischemic disease.  Associated mild cerebral atrophy is noted.   2.  Atherosclerosis.           Assessment/Plan  * Sepsis due to pneumonia (HCC)   Assessment & Plan    This is severe sepsis with the following associated acute organ dysfunction(s): metabolic/septic encephalopathy.   2/2 acute PNA  Lactic acidosis resolved with IVF, Normotensive now with IVF.  bcx neg   Resp and O2 per protocol   Echo Hyperdynamic left ventricular systolic function.  Left ventricular   ejection fraction is visually estimated to be greater than 75%.      Debility- (present on admission)   Assessment & Plan    PT OT recommending skilled nursing facility however patient and family refusing accordingly home health was ordered.  Patient and family understanding, accepting the risk of going with home health rather than skilled nursing facility.      Pneumonia- (present on admission)   Assessment & Plan    As above in sepsis due to pneumonia       Acute respiratory failure with hypoxia (HCC)   Assessment & Plan    Secondary to pneumonia   Getting antibiotics  Home O2 evaluation revealing the need to for 4 L of oxygen  with ambulation, ordered      Chronic anticoagulation   Assessment & Plan    For history of PE  No active bleeding   Continue warfarin  Monitor INR closely       Rash   Assessment & Plan    Resolved, observe     Toxic metabolic encephalopathy   Assessment & Plan    Multifactorial but most likely due to pneumonia  Improved with treating the underlying cause      DNR (do not resuscitate) discussion   Assessment & Plan    The previous attending discussed with wife.  Code is DNR/DNI  POLST filled      History of pulmonary embolism- (present on admission)   Assessment & Plan    Noted. On anticoagulation.           VTE prophylaxis: warfarin

## 2019-02-05 NOTE — DISCHARGE PLANNING
Anticipated Discharge Disposition: Home with oxygen and home health.    Action: RN CM met with pt and spouse at bedside to discuss dc plan. Son is out getting them lunch. CHOICE explained. CHOICE for home health and oxygen dme obtained and faxed to [facility] at ext. [phone].    Barriers to Discharge: Accepting home health agency and accepting dme.    Plan: RN CM to update team when accepting agencies obtained.

## 2019-02-05 NOTE — FACE TO FACE
"Face to Face Note  -  Durable Medical Equipment    Anastasia Royal M.D. - NPI: [id number]  I certify that this patient is under my care and that they had a durable medical equipment(DME)face to face encounter by myself that meets the physician DME face-to-face encounter requirements with this patient on:    Date of encounter:   Patient:                    MRN:                       YOB: 2019  Mani Louise  [id number]  3/26/1926     The encounter with the patient was in whole, or in part, for the following medical condition, which is the primary reason for durable medical equipment:  Other - History of pulmonary embolism,  and pneumonia   Debility     I certify that, based on my findings, the following durable medical equipment is medically necessary:  Continuous Oxygen with ambulation    Front-Wheel Walker, Shower Chair     HOME O2 Saturation Measurements:(Values must be present for Home Oxygen orders)  Room air sat at rest: 95  Room air sat with amb: 80    With Liters of O2: 4, O2 sat with amb with O2 : 93  Is the patient mobile?: Yes    My Clinical findings support the need for the above equipment due to:  Hypoxia  Unsteadiness and weakness   Supporting Symptoms: The patient requires supplemental oxygen, as the following interventions have been tried with limited or no improvement: \"Ambulation with oximetry and \"Incentive spirometry    "

## 2019-02-05 NOTE — CONSULTS
Reason for PC Consult: Advance Care Planning    Consulted by: Jayy Reid MD    Assessment:  General: 89yo gentleman BIB EMS and admitted through ED with sepsis 2/2 CAP PNA with delirium, possible GLF.  Head CT and echo WNL, pt with clearing cognition today, on vancomycin.  PMH: PE (coumadin), insomnia (Halcion), Seizures, migraines, [place]     Dyspnea: No-    Last BM: 02/03/19-    Pain: No-    Depression: Mood appropriate for situation-      Spiritual:  Is Orthodoxy or spirituality important for coping with this illness? No-    Has a  or spiritual provider visit been requested? No    Palliative Performance Scale: 70    Advanced Directive: None- Pt and family believe they have AD with DPOA-HC at home.  Encouraged them to review these documents together as a family and address/update any changes to pt's wishes/Statements of Desires.  They all verbalized understanding and expressed intent to do so.  DPOA:  -    POLST:No- completed at this encounter for DNR/DNI/no artificial nutrition.  For MD review and signature tomorrow    Code Status: DNR- DNI    Social:   Pt lives with his wife of 66 years in [place] where he walks a few miles everyday, weather permitting.  They have a support system in their neighbors.  Pt was a  and traveled throughout the world.  He has a son Joni who lives in [place] but currently at bedside and two dtrs.    Outcome:  Introduced self and role of Palliative Care to pt, his wife Nydia and son Joni.  Pt had in hearing aid but still with some difficulty.  Assessed pt's understanding of his current medical status, overall health picture, and options for future care.  Pt stated his overall health and that he didn't have recollection of the incidents prompting this admission.  His wife filled in the information.    Explored pt's values, beliefs, and preferences in order to identify GOC.  Pt stated the high value of his independence and cognitive function.  He  "expressed a fear of alzheimer's d/t some recent forgetfulness.  Pt very clear on his DNR/DNI and no TF (though food is not a QoL issue for him) - wife and son stated these are consistent desires expressed by pt and that they intend to follow.  Pt would decline any recommendation for SNF rehab or HH PT.  He stated he is \"wobbly\" but still walks his miles and has no intention of using assistive devices, though there is a walker at home from his wife's hip surgery.  Emmanuel discussion of pt's anticoagulation risk with fall risk.  Pt and family acknowledged this risk.    Pt's son wanted to speak outside and expressed his intent to balance his parents' desire for autonomy with safety.    Active listening, reflection, reminiscing, validation and empathic support utilized throughout this encounter.  All questions answered.  PC contact information given.     Discussed with/Updated: Dr. Lewis    Plan: home    Recommendations:  I do not recommend an ethics or hospice consult at this time because pt meets no criteria for hospice.    Thank you for allowing Palliative Care to participate in this patient's care. Please feel free to call [phone] with any questions or concerns.  "

## 2019-02-05 NOTE — PROGRESS NOTES
Inpatient Anticoagulation Service Note    Date: 2/5/2019  Reason for Anticoagulation: Deep Vein Thrombosis, Pulmonary Embolism        Hemoglobin Value: (!) 12.2  Hematocrit Value: (!) 34.9  Lab Platelet Value: (!) 158  Target INR: 2.0 to 3.0    INR from last 7 days     Date/Time INR Value    02/05/19 0214 (!)  2.83    02/04/19 0547 (!)  2.62    02/03/19 0756 (!)  2.07    02/02/19 0557 (!)  2.3        Dose from last 7 days     Date/Time Dose (mg)    02/05/19 1400  1.25    02/04/19 1300  2.5    02/03/19 1400  2.5    02/02/19 1500  2.5        Average Dose (mg):  (1.25 mg Thursdays, 2.5 mg ROW)  Significant Interactions: Antibiotics  Bridge Therapy: No   Reversal Agent Administered: Not Applicable    HPI: Patient admitted for encephalopathy likely 2/2 to infection, Dx: PNA, r/o meningitis. 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 Warfarin is managed outpatient by the [facility] and per records, patient is prescribed warfarin 1.25mg po every Thursday and warfarin 2.5mg po on all other days with consistent INR readings within therapeutic range in clinic visit.      Assessment: INR remains within therapeutic range with continued trend upward to upper end of goal range.   Potential drug-drug interactions identified with acute inpatient medications: Antibiotics - potential to increase INR. Last dose of ceftriaxone tomorrow morning 2/6/19 to complete a 5-day course per ID.   Potential drug-drug interactions identified with chronic home medications: No DDIs identified  Inpatient Diet: Regular      Plan: Give a lower dose warfarin 1.25mg tonight then resume usual home dosing regimen. INR tomorrow.      Education Material Provided?: No (Chronic warfarin patient)     Pharmacist suggested discharge dosing: Warfarin 1.25mg po every Thursday and warfarin 2.5mg po on all other days     Joyce Washington, PharmD

## 2019-02-05 NOTE — THERAPY
"Physical Therapy Treatment completed.   Bed Mobility:  Supine to Sit: (NT, sitting EOB with CNA upon entry)  Transfers: Sit to Stand: Contact Guard Assist  Gait: Level Of Assist: Contact Guard Assist with Front-Wheel Walker       Plan of Care: Will benefit from Physical Therapy 3 times per week  Discharge Recommendations: Equipment: Patient has FWW. Post-acute therapy: see below.    See \"Rehab Therapy-Acute\" Patient Summary Report for complete documentation.       Patient progressing as expected. Patient continues to be limited by impaired insight and safety awareness, questioned use of FWW and CGA during mobility, but demonstrated improved activity tolerance, functional strength, and balance this session. Patient ambulated approximately 250ft with FWW and CGA. Patient required 4L O2 during ambulation to maintain SpO2 > 90%. Patient continues to present as high fall risk. Provided patient and spouse education regarding fall risk, increased risk of mortality with falls, and DC recommendations, recommend 24/7 supervision. Patient pending DC home given refusal of SNF, patient will benefit from home health PT following DC.  "

## 2019-02-05 NOTE — PROGRESS NOTES
Report received at bedside, pt. Denies pain or discomfort, A&Ox3, 1 L of oxygen via NC, POC resumed, family at bedside.

## 2019-02-05 NOTE — PROGRESS NOTES
Notified Dr. Mosqueda of pt's /101. There are no schedule/prn BP meds ordered for pt. Orders to give hydralazine 10 mg IV every 6 hours prn for sbp greater than 160. Read back by Connie BENITEZ.

## 2019-02-05 NOTE — DISCHARGE PLANNING
Received Choice form at [id number]  Agency/Facility Name: [facility]   Referral sent per Choice form @ 1939

## 2019-02-05 NOTE — PROGRESS NOTES
Received bedside report from RN, pt care assumed, VSS, pt assessment complete. Pt AAOx3, pt doesn't c/o pain at this time. No signs of acute distress noted at this time. POC discussed with pt and verbalizes no questions. Pt denies any additional needs at this time. Bed in lowest position, bed alarm on, pt educated on fall risk and verbalized understanding, call light within reach, hourly rounding initiated. Son and daughter at bedside.

## 2019-02-05 NOTE — FACE TO FACE
Face to Face Supporting Documentation - Home Health    The encounter with this patient was in whole or in part the primary reason for home health admission.    Date of encounter:   Patient:                    MRN:                       YOB: 2019  Mani Louise  [id number]  3/26/1926     Home health to see patient for:  Skilled Nursing care for assessment, interventions & education, Physical Therapy evaluation and treatment and Occupational therapy evaluation and treatment    Skilled need for:  Exacerbation of Chronic Disease State Debility, Pneumonia    Skilled nursing interventions to include:  Comment: PT and OT    Homebound status evidenced by:  Needs the assistance of another person in order to leave the home. Leaving home requires a considerable and taxing effort. There is a normal inability to leave the home.    Community Physician to provide follow up care: Shan Richey M.D.     Optional Interventions? No      I certify the face to face encounter for this home health care referral meets the CMS requirements and the encounter/clinical assessment with the patient was, in whole, or in part, for the medical condition(s) listed above, which is the primary reason for home health care. Based on my clinical findings: the service(s) are medically necessary, support the need for home health care, and the homebound criteria are met.  I certify that this patient has had a face to face encounter by myself.  Anastasia Royal M.D. - NPI: [id number]

## 2019-02-05 NOTE — PALLIATIVE CARE
PALLIATIVE CARE FOLLOW-UP:    POLST reviewed/completed by Dr. Royal.  Scanned to EPIC.  Original to pt's son Joni who expressed appreciation for paperwork and yesterday's conversation.       Plan:  D/c home    Thank you for allowing Palliative Care to support this pt and his family.  Contact [phone] for additional assistance, patient status change, questions or concerns.

## 2019-02-05 NOTE — DISCHARGE PLANNING
Agency/Facility Name: [facility]  Spoke To: Gloria  Outcome: Requesting Progress notes from 2/5 and discharge date.

## 2019-02-05 NOTE — ASSESSMENT & PLAN NOTE
Secondary to pneumonia   Getting antibiotics  Home O2 evaluation revealing the need to for 4 L of oxygen with ambulation, ordered

## 2019-02-05 NOTE — RESPIRATORY CARE
COPD EDUCATION by COPD CLINICAL EDUCATOR  2/5/2019 at 6:30 AM by Laurence Shaffer     Patient reviewed by COPD education team. Patient does not qualify for COPD program.

## 2019-02-06 VITALS
OXYGEN SATURATION: 96 % | BODY MASS INDEX: 27 KG/M2 | WEIGHT: 178.13 LBS | HEART RATE: 74 BPM | HEIGHT: 68 IN | RESPIRATION RATE: 18 BRPM | SYSTOLIC BLOOD PRESSURE: 136 MMHG | TEMPERATURE: 99.3 F | DIASTOLIC BLOOD PRESSURE: 71 MMHG

## 2019-02-06 LAB
INR PPP: 2.86 (ref 0.87–1.13)
PROTHROMBIN TIME: 30 SEC (ref 12–14.6)

## 2019-02-06 PROCEDURE — A9270 NON-COVERED ITEM OR SERVICE: HCPCS | Performed by: HOSPITALIST

## 2019-02-06 PROCEDURE — 700111 HCHG RX REV CODE 636 W/ 250 OVERRIDE (IP): Performed by: INTERNAL MEDICINE

## 2019-02-06 PROCEDURE — 99239 HOSP IP/OBS DSCHRG MGMT >30: CPT | Performed by: HOSPITALIST

## 2019-02-06 PROCEDURE — A9270 NON-COVERED ITEM OR SERVICE: HCPCS | Performed by: INTERNAL MEDICINE

## 2019-02-06 PROCEDURE — 700102 HCHG RX REV CODE 250 W/ 637 OVERRIDE(OP): Performed by: INTERNAL MEDICINE

## 2019-02-06 PROCEDURE — 700102 HCHG RX REV CODE 250 W/ 637 OVERRIDE(OP): Performed by: HOSPITALIST

## 2019-02-06 PROCEDURE — 36415 COLL VENOUS BLD VENIPUNCTURE: CPT

## 2019-02-06 PROCEDURE — 700105 HCHG RX REV CODE 258: Performed by: INTERNAL MEDICINE

## 2019-02-06 PROCEDURE — 85610 PROTHROMBIN TIME: CPT

## 2019-02-06 RX ADMIN — CEFTRIAXONE SODIUM 2 G: 2 INJECTION, POWDER, FOR SOLUTION INTRAMUSCULAR; INTRAVENOUS at 05:23

## 2019-02-06 RX ADMIN — Medication 1 CAPSULE: at 08:14

## 2019-02-06 RX ADMIN — WARFARIN SODIUM 2.5 MG: 2.5 TABLET ORAL at 17:06

## 2019-02-06 NOTE — DISCHARGE PLANNING
Agency[facility]Facility Name: [facility]  Spoke To: Gloria  Outcome: Accepted, HH will call family 9-9:30 tomorrow. Requested to be informed of discharge date.

## 2019-02-06 NOTE — DISCHARGE SUMMARY
Discharge Summary    CHIEF COMPLAINT ON ADMISSION  Chief Complaint   Patient presents with   • ALOC     Pt was normal last night, but found this morning naked and shivering.  He is also very confused.  Speaking clear words, but not making sense with his sentences.  Temp = 38.3, RA saturation = 89% per EMS driver.       Reason for Admission  Altered mental status     Admission Date  2/2/2019    CODE STATUS  DNAR/DNI    HPI & HOSPITAL COURSE  89 y.o. male who presented 2/2/2019 with mental status change, fever, found to have an abnormal chest x-ray with diffuse hazy pulmonary opacities bilaterally.  In the emergency room, pt was found to be hypotensive, and febrile.  He was started on IV antibiotics, IV fluids for sepsis due to pneumonia. Patient was having encephalopathy infectious disease were consulted for concern for meninginitis.  Infectious disease specialist thought his encephalopathy is related to sepsis and pneumonia pneumonia, rather than meningitis. Patient improved on a 5-day course of ceftriaxone that was recommended by infectious disease.  Patient continued to have hypoxemia with ambulation, accordingly home oxygen was prescribed.  Palliative care was consulted patient and family want to go with DNAR/DNI and POLST form was filled.  Physical and Occupational Therapy evaluated the patient and recommended skilled nursing facility, however patient and family refusing. Patient and family understanding, accepting the risk of possible injury, harm and even death with going with home health rather than skilled nursing facility. Accordingly, home health was ordered.  Patient is on chronic anticoagulation for history of pulmonary embolism no changes were made to his Coumadin dose.    Therefore, he is discharged in fair and stable condition to home with close outpatient follow-up.  The patient met 2-midnight criteria for an inpatient stay at the time of discharge.    Discharge Date  2/6/2019   I personally assessed  and examined the patient at bedside on the day of discharge.  I discussed with the family and patient.  Questions were answered.    FOLLOW UP ITEMS POST DISCHARGE  Follow-up with primary care within a week    DISCHARGE DIAGNOSES  Principal Problem:    Sepsis due to pneumonia (HCC) POA: Yes  Active Problems:    Debility POA: Yes    History of pulmonary embolism POA: Yes    DNR (do not resuscitate) discussion POA: Unknown    Toxic metabolic encephalopathy POA: Yes    Rash POA: Unknown    Chronic anticoagulation POA: Unknown    Acute respiratory failure with hypoxia (HCC) POA: Yes    Pneumonia POA: Yes  Resolved Problems:    * No resolved hospital problems. *      FOLLOW UP  Shan Richey M.D.  [id number]  [facility] [address]  [phone]      Please walk in to see your provider for a follow up appointment. Thank you      MEDICATIONS ON DISCHARGE     Medication List      CONTINUE taking these medications      Instructions   dutasteride 0.5 MG capsule  Commonly known as:  AVODART   Take 0.5 mg by mouth every day.  Dose:  0.5 mg     triazolam 0.25 MG Tabs  Commonly known as:  HALCION   Take 0.5 mg by mouth at bedtime as needed.  Dose:  0.5 mg     warfarin 2.5 MG Tabs  Commonly known as:  COUMADIN   Take 1.25-2.5 mg by mouth every day. 2.5mg every day except 1.25mg on Thursdays  Dose:  1.25-2.5 mg            Allergies  No Known Allergies    DIET  Orders Placed This Encounter   Procedures   • Diet Order Regular     Standing Status:   Standing     Number of Occurrences:   1     Order Specific Question:   Diet:     Answer:   Regular [1]       ACTIVITY  As tolerated.  Weight bearing as tolerated    CONSULTATIONS  Infectious disease  Palliative care     PROCEDURES  N/A    LABORATORY  Lab Results   Component Value Date    SODIUM 137 02/03/2019    POTASSIUM 3.8 02/03/2019    CHLORIDE 106 02/03/2019    CO2 24 02/03/2019    GLUCOSE 101 (H) 02/03/2019    BUN 16 02/03/2019    CREATININE 1.07 02/03/2019        Lab  Results   Component Value Date    WBC 7.5 02/03/2019    HEMOGLOBIN 12.2 (L) 02/03/2019    HEMATOCRIT 34.9 (L) 02/03/2019    PLATELETCT 158 (L) 02/03/2019        Total time of the discharge process exceeds 36 minutes.

## 2019-02-06 NOTE — PROGRESS NOTES
Spoke with family. No needs at this time. Family anxious about being discharged today. Awaiting f/u with SW. Will continue to monitor.

## 2019-02-06 NOTE — DISCHARGE PLANNING
Agency/Facility Name: [facility]  Spoke To: Gloria  Outcome: Per request sent MD progress note, acceptance pending on progress note.

## 2019-02-06 NOTE — PROGRESS NOTES
Report received from nightshift rn. Pt aaox4, call light within reach, no complaint of pain, family at bedside. Pt and family express concerns about wanting to be discharged today. Pt mentioned he was leaving whether he was discharged or not. Family asked about AMA. Discussed plan of care with pt. Will continue to monitor.

## 2019-02-06 NOTE — PROGRESS NOTES
Report received at bedside, pt. Denies pain or discomfort, wife at bedside, pt. Sitting bed having dinner, he is A&Ox3, RA, POC resumed

## 2019-02-06 NOTE — DISCHARGE PLANNING
Agency/Facility Name: [facility]  Spoke To: Tori  Outcome: Did not receive updated order, resent to Rene.

## 2019-02-06 NOTE — DISCHARGE PLANNING
Agency/Facility Name:[facility]  Spoke To: Brenda  Outcome: Received order processing referral.

## 2019-02-06 NOTE — DISCHARGE PLANNING
Agency/Facility Name: [facility]  Outcome: Requesting RX to state continuous or nocturnal O2, what alternative treatments have been tried and failed, justification criteria for the walker.

## 2019-02-06 NOTE — DISCHARGE PLANNING
Agency/Facility Name: [facility]  Outcome: Left message for Gloria, awaiting call back.

## 2019-02-07 LAB
BACTERIA BLD CULT: NORMAL
BACTERIA BLD CULT: NORMAL
SIGNIFICANT IND 70042: NORMAL
SIGNIFICANT IND 70042: NORMAL
SITE SITE: NORMAL
SITE SITE: NORMAL
SOURCE SOURCE: NORMAL
SOURCE SOURCE: NORMAL

## 2019-02-07 NOTE — DISCHARGE PLANNING
Medical Social Work     LSW spoke to bedside RN who was wondering if pt's O2 was going to be delivered tonight. LSW called [facility] after hours service who stated they will have on call  call this LSW.     LSW waiting for returned call from [facility] after hours .     ADD at 6:25- LSW received returned call from after hours  who stated that pt's referral was not fully processed. Stated that he can deliver the O2 tonight but that pt would have to sign an ABN form stating that they will accept financial responsibility if pt's Medicare does not cover it. LSW called and spoke to pt's son Mani at bedside who stated that they are willing to sign the form and would like to leave tonight. LSW called on call  with [facility] who stated that 02 tank and concentrator will be delivered in about 30 min. LSW updated bedside RN who will begin d/c process.

## 2019-02-07 NOTE — DISCHARGE PLANNING
Agency/Facility Name: [facility]   Spoke To: Brenda  Outcome: Still processing, there is a possibility that they may delivery between now and 1800 if they finish processing order.

## 2019-02-07 NOTE — PROGRESS NOTES
Inpatient Anticoagulation Service Note    Date: 2/6/2019  Reason for Anticoagulation: Deep Vein Thrombosis, Pulmonary Embolism        Hemoglobin Value: (!) 12.2  Hematocrit Value: (!) 34.9  Lab Platelet Value: (!) 158  Target INR: 2.0 to 3.0    INR from last 7 days     Date/Time INR Value    02/06/19 0228 (!)  2.86    02/05/19 0214 (!)  2.83    02/04/19 0547 (!)  2.62    02/03/19 0756 (!)  2.07    02/02/19 0557 (!)  2.3        Dose from last 7 days     Date/Time Dose (mg)    02/06/19 1600  2.5    02/05/19 1400  1.25    02/04/19 1300  2.5    02/03/19 1400  2.5    02/02/19 1500  2.5        Average Dose (mg):  (1.25 mg Thursdays, 2.5 mg ROW)  Significant Interactions: Not Applicable (ABX therapy complete today)  Bridge Therapy: No   Reversal Agent Administered: Not Applicable    HPI: Patient admitted for encephalopathy likely 2/2 to infection, Dx: PNA, r/o meningitis. 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 Warfarin is managed outpatient by the [facility] and per records, patient is prescribed warfarin 1.25mg po every Thursday and warfarin 2.5mg po on all other days with consistent INR readings within therapeutic range in clinic visit.      Assessment: INR remains within therapeutic range with continued trend upward to upper end of goal range.   Potential drug-drug interactions identified with acute inpatient medications: Course of Antibiotics completed this morning 2/6/19.   Potential drug-drug interactions identified with chronic home medications: No DDIs identified  Inpatient Diet: Regular      Plan: Resume usual home dosing regimen. INR tomorrow pending discharge -  Patient is medically cleared awaiting placement.      Education Material Provided?: No (Chronic warfarin patient)     Pharmacist suggested discharge dosing: Warfarin 1.25mg po every Thursday and warfarin 2.5mg po on all other days     Joyce Washington, PharmD

## 2019-02-07 NOTE — PROGRESS NOTES
Pt updated on at home o2 status with SW. Still currently pending with company. Will continue to update family and pt. No other concerns at this time.

## 2019-02-27 LAB — INR PPP: 3 (ref 2–3.5)

## 2019-03-05 ENCOUNTER — ANTICOAGULATION MONITORING (OUTPATIENT)
Dept: VASCULAR LAB | Facility: MEDICAL CENTER | Age: 84
End: 2019-03-05

## 2019-03-05 ENCOUNTER — TELEPHONE (OUTPATIENT)
Dept: VASCULAR LAB | Facility: MEDICAL CENTER | Age: 84
End: 2019-03-05

## 2019-03-05 DIAGNOSIS — Z86.711 HISTORY OF PULMONARY EMBOLISM: ICD-10-CM

## 2019-03-05 NOTE — TELEPHONE ENCOUNTER
Requested PT/INR results from [facility]  Claire Melendez, Clinical Pharmacist

## 2019-03-05 NOTE — PROGRESS NOTES
Anticoagulation Summary  As of 3/5/2019    INR goal:   2.0-3.0   TTR:   89.2 % (1.2 y)   INR used for dosing:   3.0 (2/27/2019)   Warfarin maintenance plan:   1.25 mg (2.5 mg x 0.5) every Thu; 2.5 mg (2.5 mg x 1) all other days   Weekly warfarin total:   16.25 mg   Plan last modified:   Keenan Griggs, PharmD (6/27/2018)   Next INR check:   3/19/2019   Target end date:   Indefinite    Indications    History of pulmonary embolism [Z86.711]             Anticoagulation Episode Summary     INR check location:       Preferred lab:       Send INR reminders to:       Comments:         Anticoagulation Care Providers     Provider Role Specialty Phone number    SHERLY Lees [facility] Card[facility] [phone]        Anticoagulation Patient Findings  Patient Findings     Positives:   Hospital admission    Negatives:   Signs/symptoms of thrombosis, Signs/symptoms of bleeding, Laboratory test error suspected, Change in health, Change in alcohol use, Change in activity, Upcoming invasive procedure, Emergency department visit, Upcoming dental procedure, Missed doses, Extra doses, Change in medications, Change in diet/appetite, Bruising, Other complaints    Comments:   Hospitalized for pneumonia         Spoke with patients wife today regarding therapeutic INR of 3.0.  Patient denies any signs/symptoms of bruising or bleeding or any changes in diet and medications.  Instructed patient to call clinic with any questions or concerns.  Pt is to continue with current warfarin dosing regimen.  Follow up in 2 weeks, to reduce risk of adverse events related to this high risk medication,  Warfarin.    Keenan Griggs, PharmD

## 2019-03-21 ENCOUNTER — ANTICOAGULATION MONITORING (OUTPATIENT)
Dept: VASCULAR LAB | Facility: MEDICAL CENTER | Age: 84
End: 2019-03-21

## 2019-03-21 DIAGNOSIS — Z86.711 HISTORY OF PULMONARY EMBOLISM: ICD-10-CM

## 2019-03-21 LAB — INR PPP: 2.8 (ref 2–3.5)

## 2019-03-21 NOTE — PROGRESS NOTES
Anticoagulation Summary  As of 3/21/2019    INR goal:   2.0-3.0   TTR:   89.7 % (1.2 y)   INR used for dosin.8 (3/21/2019)   Warfarin maintenance plan:   1.25 mg (2.5 mg x 0.5) every Thu; 2.5 mg (2.5 mg x 1) all other days   Weekly warfarin total:   16.25 mg   No change documented:   Chapin Correia Ass't   Plan last modified:   Keenan Griggs, PharmD (2018)   Next INR check:   2019   Target end date:   Indefinite    Indications    History of pulmonary embolism [Z86.711]             Anticoagulation Episode Summary     INR check location:       Preferred lab:       Send INR reminders to:       Comments:         Anticoagulation Care Providers     Provider Role Specialty Phone number    SHERLY Lees [facility] [phone]        Anticoagulation Patient Findings  Patient Findings     Negatives:   Signs/symptoms of thrombosis, Signs/symptoms of bleeding, Laboratory test error suspected, Change in health, Change in alcohol use, Change in activity, Upcoming invasive procedure, Emergency department visit, Upcoming dental procedure, Missed doses, Extra doses, Change in medications, Change in diet/appetite, Hospital admission, Bruising, Other complaints          Plan: Spoke to patient. Patient is therapeutic and will remain on the same dose. Patient reports no unusual bleeding or bruising and no changes to medication or diet. Patient is to be checked again in 4 weeks.    Chapin Correia. Ass't  [facility]    I have reviewed and concur with the above plan on 2019.  Claire Melendez, Clinical Pharmacist

## 2019-04-17 LAB — INR PPP: 3 (ref 2–3.5)

## 2019-04-23 ENCOUNTER — TELEPHONE (OUTPATIENT)
Dept: VASCULAR LAB | Facility: MEDICAL CENTER | Age: 84
End: 2019-04-23

## 2019-04-23 NOTE — TELEPHONE ENCOUNTER
Patient called into the clinic today stating that he had his INR drawn on 4/17 and hasn't heard from us yet. No result was found for this patient in Epic. STAT fax was sent to [facility] for the result.    Chapin Correia. Ass't  [facility]

## 2019-04-24 ENCOUNTER — ANTICOAGULATION MONITORING (OUTPATIENT)
Dept: VASCULAR LAB | Facility: MEDICAL CENTER | Age: 84
End: 2019-04-24

## 2019-04-24 DIAGNOSIS — Z86.711 HISTORY OF PULMONARY EMBOLISM: ICD-10-CM

## 2019-04-24 NOTE — PROGRESS NOTES
Anticoagulation Summary  As of 4/24/2019    INR goal:   2.0-3.0   TTR:   90.3 % (1.3 y)   INR used for dosing:   3.00 (4/17/2019)   Warfarin maintenance plan:   1.25 mg (2.5 mg x 0.5) every Thu; 2.5 mg (2.5 mg x 1) all other days   Weekly warfarin total:   16.25 mg   No change documented:   Mynor Capellan Med Ass't   Plan last modified:   Keenan Griggs, PharmD (6/27/2018)   Next INR check:   5/15/2019   Target end date:   Indefinite    Indications    History of pulmonary embolism [Z86.711]             Anticoagulation Episode Summary     INR check location:       Preferred lab:       Send INR reminders to:       Comments:         Anticoagulation Care Providers     Provider Role Specialty Phone number    SHERLY Lees [facility] [phone]        Anticoagulation Patient Findings  Patient Findings     Negatives:   Signs/symptoms of thrombosis, Signs/symptoms of bleeding, Laboratory test error suspected, Change in health, Change in alcohol use, Change in activity, Upcoming invasive procedure, Emergency department visit, Upcoming dental procedure, Missed doses, Extra doses, Change in medications, Change in diet/appetite, Hospital admission, Bruising, Other complaints        Spoke with patient to report a therapeutic INR.  Pt instructed to continue with current warfarin dosing regimen. Pt denies any s/s of bleeding, bruising, clotting or any changes to diet or medication.  Will follow up in 4 weeks.    Mynor Capellan Med Ass't    I have reviewed and am in agreement with the above stated plan on 4-24-19.  Keenan Griggs, PharmD

## 2019-05-15 ENCOUNTER — ANTICOAGULATION MONITORING (OUTPATIENT)
Dept: VASCULAR LAB | Facility: MEDICAL CENTER | Age: 84
End: 2019-05-15

## 2019-05-15 DIAGNOSIS — Z86.711 HISTORY OF PULMONARY EMBOLISM: ICD-10-CM

## 2019-05-15 LAB — INR PPP: 2.9 (ref 2–3.5)

## 2019-05-15 NOTE — PROGRESS NOTES
Anticoagulation Summary  As of 5/15/2019    INR goal:   2.0-3.0   TTR:   90.9 % (1.4 y)   INR used for dosin.90 (5/15/2019)   Warfarin maintenance plan:   1.25 mg (2.5 mg x 0.5) every Thu; 2.5 mg (2.5 mg x 1) all other days   Weekly warfarin total:   16.25 mg   Plan last modified:   Keenan Griggs, PharmD (2018)   Next INR check:   2019   Target end date:   Indefinite    Indications    History of pulmonary embolism [Z86.711]             Anticoagulation Episode Summary     INR check location:       Preferred lab:       Send INR reminders to:       Comments:         Anticoagulation Care Providers     Provider Role Specialty Phone number    SHERLY Lees [facility] Card[facility] [phone]        Anticoagulation Patient Findings  Patient Findings     Negatives:   Signs/symptoms of thrombosis, Signs/symptoms of bleeding, Laboratory test error suspected, Change in health, Change in alcohol use, Change in activity, Upcoming invasive procedure, Emergency department visit, Upcoming dental procedure, Missed doses, Extra doses, Change in medications, Change in diet/appetite, Hospital admission, Bruising, Other complaints        Spoke with patient today regarding therapeutic INR of 2.9.  Patient denies any signs/symptoms of bruising or bleeding or any changes in diet and medications.  Instructed patient to call clinic with any questions or concerns.  Pt is to continue with current warfarin dosing regimen.  Follow up in 6 weeks, to reduce risk of adverse events related to this high risk medication,  Warfarin.    Keenan Griggs, PharmD

## 2019-06-19 ENCOUNTER — ANTICOAGULATION MONITORING (OUTPATIENT)
Dept: VASCULAR LAB | Facility: MEDICAL CENTER | Age: 84
End: 2019-06-19

## 2019-06-19 DIAGNOSIS — Z86.711 HISTORY OF PULMONARY EMBOLISM: ICD-10-CM

## 2019-06-19 LAB — INR PPP: 2.8 (ref 2–3.5)

## 2019-06-19 NOTE — PROGRESS NOTES
Anticoagulation Summary  As of 2019    INR goal:   2.0-3.0   TTR:   91.4 % (1.5 y)   INR used for dosin.80 (2019)   Warfarin maintenance plan:   1.25 mg (2.5 mg x 0.5) every Thu; 2.5 mg (2.5 mg x 1) all other days   Weekly warfarin total:   16.25 mg   No change documented:   Chapin Garrido Ass't   Plan last modified:   Keenan Griggs, Marlon (2018)   Next INR check:   2019   Target end date:   Indefinite    Indications    History of pulmonary embolism [Z86.711]             Anticoagulation Episode Summary     INR check location:       Preferred lab:       Send INR reminders to:       Comments:         Anticoagulation Care Providers     Provider Role Specialty Phone number    SHERLY Lees [facility] Card[facility] [phone]        Anticoagulation Patient Findings  Patient Findings     Negatives:   Signs/symptoms of thrombosis, Signs/symptoms of bleeding, Laboratory test error suspected, Change in health, Change in alcohol use, Change in activity, Upcoming invasive procedure, Emergency department visit, Upcoming dental procedure, Missed doses, Extra doses, Change in medications, Change in diet/appetite, Hospital admission, Bruising, Other complaints      Spoke with patient son to report a therapeutic INR.  Pt instructed to continue with current warfarin dosing regimen. Pt denies any s/s of bleeding, bruising, clotting or any changes to diet or medication.  Will follow up in 6 weeks.  Chapin Garrido Ass't    I have reviewed and am in agreement with the above stated plan on 19.  Keenan Griggs, LinaD

## 2019-07-11 ENCOUNTER — APPOINTMENT (RX ONLY)
Dept: URBAN - METROPOLITAN AREA CLINIC 38 | Facility: CLINIC | Age: 84
Setting detail: DERMATOLOGY
End: 2019-07-11

## 2019-07-11 DIAGNOSIS — I87.2 VENOUS INSUFFICIENCY (CHRONIC) (PERIPHERAL): ICD-10-CM

## 2019-07-11 DIAGNOSIS — L57.0 ACTINIC KERATOSIS: ICD-10-CM

## 2019-07-11 DIAGNOSIS — L85.3 XEROSIS CUTIS: ICD-10-CM

## 2019-07-11 PROCEDURE — ? COUNSELING

## 2019-07-11 PROCEDURE — 17000 DESTRUCT PREMALG LESION: CPT

## 2019-07-11 PROCEDURE — 17003 DESTRUCT PREMALG LES 2-14: CPT

## 2019-07-11 PROCEDURE — ? LIQUID NITROGEN

## 2019-07-11 PROCEDURE — 99203 OFFICE O/P NEW LOW 30 MIN: CPT | Mod: 25

## 2019-07-11 ASSESSMENT — LOCATION DETAILED DESCRIPTION DERM
LOCATION DETAILED: LEFT TRAGUS
LOCATION DETAILED: LEFT DISTAL PRETIBIAL REGION
LOCATION DETAILED: RIGHT SUPERIOR UPPER BACK
LOCATION DETAILED: RIGHT DISTAL PRETIBIAL REGION
LOCATION DETAILED: STERNUM
LOCATION DETAILED: RIGHT DISTAL POSTERIOR THIGH
LOCATION DETAILED: LEFT CENTRAL TEMPLE

## 2019-07-11 ASSESSMENT — LOCATION SIMPLE DESCRIPTION DERM
LOCATION SIMPLE: LEFT TEMPLE
LOCATION SIMPLE: RIGHT POSTERIOR THIGH
LOCATION SIMPLE: CHEST
LOCATION SIMPLE: LEFT EAR
LOCATION SIMPLE: RIGHT PRETIBIAL REGION
LOCATION SIMPLE: RIGHT UPPER BACK
LOCATION SIMPLE: LEFT PRETIBIAL REGION

## 2019-07-11 ASSESSMENT — LOCATION ZONE DERM
LOCATION ZONE: TRUNK
LOCATION ZONE: LEG
LOCATION ZONE: FACE
LOCATION ZONE: EAR

## 2019-07-31 ENCOUNTER — ANTICOAGULATION MONITORING (OUTPATIENT)
Dept: VASCULAR LAB | Facility: MEDICAL CENTER | Age: 84
End: 2019-07-31

## 2019-07-31 DIAGNOSIS — Z79.01 CHRONIC ANTICOAGULATION: ICD-10-CM

## 2019-07-31 DIAGNOSIS — Z86.711 HISTORY OF PULMONARY EMBOLISM: ICD-10-CM

## 2019-07-31 LAB — INR PPP: 3.2 (ref 2–3.5)

## 2019-07-31 NOTE — PROGRESS NOTES
OP Telephone Anticoagulation Service Note    Date: 7/31/2019      Anticoagulation Summary  As of 7/31/2019    INR goal:   2.0-3.0   TTR:   88.5 % (1.6 y)   INR used for dosing:   3.20! (7/31/2019)   Warfarin maintenance plan:   1.25 mg (2.5 mg x 0.5) every Thu; 2.5 mg (2.5 mg x 1) all other days   Weekly warfarin total:   16.25 mg   Plan last modified:   Keenan Griggs, PharmD (6/27/2018)   Next INR check:   8/14/2019   Target end date:   Indefinite    Indications    History of pulmonary embolism [Z86.711]             Anticoagulation Episode Summary     INR check location:       Preferred lab:       Send INR reminders to:       Comments:         Anticoagulation Care Providers     Provider Role Specialty Phone number    SHERLY Lees [facility] Cardiology [phone]        Anticoagulation Patient Findings        Plan: Spoke with patient's wife today regarding SUPRA-therapeutic INR of 3.20 (goal 2.0 - 3.0).  Patient denies any signs/symptoms of bruising or bleeding or any changes in diet and medications. Instructed patient to call clinic with any questions or concerns. Instructed patient to take a reduced dose ONCE 1.25 mg instead of 2.5 mg, then resume current warfarin regimen. Follow up in 2 week(s) due to very consistent in-range INR readings over past several months.      Dennis Wilson, Pharmacy Intern

## 2019-08-14 ENCOUNTER — ANTICOAGULATION MONITORING (OUTPATIENT)
Dept: VASCULAR LAB | Facility: MEDICAL CENTER | Age: 84
End: 2019-08-14

## 2019-08-14 DIAGNOSIS — Z86.711 HISTORY OF PULMONARY EMBOLISM: ICD-10-CM

## 2019-08-14 LAB — INR PPP: 3 (ref 2–3.5)

## 2019-08-14 NOTE — PROGRESS NOTES
Anticoagulation Summary  As of 8/14/2019    INR goal:   2.0-3.0   TTR:   86.4 % (1.6 y)   INR used for dosing:   3.00 (8/14/2019)   Warfarin maintenance plan:   1.25 mg (2.5 mg x 0.5) every Thu; 2.5 mg (2.5 mg x 1) all other days   Weekly warfarin total:   16.25 mg   Plan last modified:   Keenan Griggs PharmD (6/27/2018)   Next INR check:   9/4/2019   Target end date:   Indefinite    Indications    History of pulmonary embolism [Z86.711]             Anticoagulation Episode Summary     INR check location:       Preferred lab:       Send INR reminders to:       Comments:         Anticoagulation Care Providers     Provider Role Specialty Phone number    SHERLY Lees [facility] Cardiology [phone]        Anticoagulation Patient Findings     Spoke with patients wife Nydia today regarding therapeutic INR of 3.0.  Patient denies any signs/symptoms of bruising or bleeding or any changes in diet and medications.  Instructed patient to call clinic with any questions or concerns.  Pt is to continue with current warfarin dosing regimen.  Follow up in 3 weeks, to reduce risk of adverse events related to this high risk medication,  Warfarin.    Keenan Griggs, Marlon, BCACP

## 2019-09-13 ENCOUNTER — ANTICOAGULATION MONITORING (OUTPATIENT)
Dept: MEDICAL GROUP | Facility: PHYSICIAN GROUP | Age: 84
End: 2019-09-13

## 2019-09-13 DIAGNOSIS — Z86.711 HISTORY OF PULMONARY EMBOLISM: ICD-10-CM

## 2019-09-13 LAB — INR PPP: 3.4 (ref 2–3.5)

## 2019-09-13 NOTE — PROGRESS NOTES
Anticoagulation Summary  As of 9/13/2019    INR goal:   2.0-3.0   TTR:   82.3 % (1.7 y)   INR used for dosing:   3.40! (9/13/2019)   Warfarin maintenance plan:   1.25 mg (2.5 mg x 0.5) every Mon, Thu; 2.5 mg (2.5 mg x 1) all other days   Weekly warfarin total:   15 mg   Plan last modified:   Jared Thornton, PharmD (9/13/2019)   Next INR check:   9/27/2019   Target end date:   Indefinite    Indications    History of pulmonary embolism [Z86.711]             Anticoagulation Episode Summary     INR check location:       Preferred lab:       Send INR reminders to:       Comments:         Anticoagulation Care Providers     Provider Role Specialty Phone number    SHERLY Lees [facility] Cardiology [phone]        Anticoagulation Patient Findings          HPI:  Mani Ruvalcabaanjana, on anticoagulation therapy with warfarin for PE.   Changes to current medical/health status since last appt: none  Denies signs/symptoms of bleeding and/or thrombosis since the last appt.    Denies any interval changes to diet  Denies any interval changes to medications since last appt.   Denies any complications or cost restrictions with current therapy.     A/P   INR  SUPRA-therapeutic.   Begin reduced regimen.     Next INR in 2 week(s).    Jared Thornton, PharmD

## 2019-09-27 ENCOUNTER — ANTICOAGULATION MONITORING (OUTPATIENT)
Dept: VASCULAR LAB | Facility: MEDICAL CENTER | Age: 84
End: 2019-09-27

## 2019-09-27 DIAGNOSIS — Z86.711 HISTORY OF PULMONARY EMBOLISM: ICD-10-CM

## 2019-09-27 LAB — INR PPP: 2.4 (ref 2–3.5)

## 2019-09-27 NOTE — PROGRESS NOTES
Anticoagulation Summary  As of 2019    INR goal:   2.0-3.0   TTR:   81.8 % (1.8 y)   INR used for dosin.40 (2019)   Warfarin maintenance plan:   1.25 mg (2.5 mg x 0.5) every Mon, Thu; 2.5 mg (2.5 mg x 1) all other days   Weekly warfarin total:   15 mg   No change documented:   Mynor Capellan, Med Ass't   Plan last modified:   Jared Thornton, PharmD (2019)   Next INR check:   10/11/2019   Target end date:   Indefinite    Indications    History of pulmonary embolism [Z86.711]             Anticoagulation Episode Summary     INR check location:       Preferred lab:       Send INR reminders to:       Comments:         Anticoagulation Care Providers     Provider Role Specialty Phone number    SHERLY Lees [facility] Cardiology [phone]        Anticoagulation Patient Findings  Patient Findings     Negatives:   Signs/symptoms of thrombosis, Signs/symptoms of bleeding, Laboratory test error suspected, Change in health, Change in alcohol use, Change in activity, Upcoming invasive procedure, Emergency department visit, Upcoming dental procedure, Missed doses, Extra doses, Change in medications, Change in diet/appetite, Hospital admission, Bruising, Other complaints        Spoke with patient to report a therapeutic INR.  Pt instructed to continue with current warfarin dosing regimen. Pt denies any s/s of bleeding, bruising, clotting or any changes to diet or medication.  Will follow up in 2 weeks.    Mynor Capellan, Med Ass't    I have reviewed and am in agreement with the above stated plan on 19.  Keenan Griggs, PharmD, BCACP

## 2019-10-11 ENCOUNTER — ANTICOAGULATION MONITORING (OUTPATIENT)
Dept: VASCULAR LAB | Facility: MEDICAL CENTER | Age: 84
End: 2019-10-11

## 2019-10-11 DIAGNOSIS — Z86.711 HISTORY OF PULMONARY EMBOLISM: ICD-10-CM

## 2019-10-11 LAB — INR PPP: 2.7 (ref 2–3.5)

## 2019-10-11 NOTE — PROGRESS NOTES
Anticoagulation Summary  As of 10/11/2019    INR goal:   2.0-3.0   TTR:   82.2 % (1.8 y)   INR used for dosin.70 (10/11/2019)   Warfarin maintenance plan:   1.25 mg (2.5 mg x 0.5) every Mon, Thu; 2.5 mg (2.5 mg x 1) all other days   Weekly warfarin total:   15 mg   Plan last modified:   Claire Melendez (10/11/2019)   Next INR check:   2019   Target end date:   Indefinite    Indications    History of pulmonary embolism [Z86.711]             Anticoagulation Episode Summary     INR check location:       Preferred lab:       Send INR reminders to:       Comments:   [facility]        Anticoagulation Care Providers     Provider Role Specialty Phone number    RENATE Lees. [facility] Cardiology [phone]        Anticoagulation Patient Findings          Spoke with Mrs. Waller to report a therapeutic INR Of 2.7. Continue current dosing regimen.  Follow up in 4 weeks, to reduce the risk of adverse events related to this high risk medication, warfarin.    Claire Melendez, Clinical Pharmacist

## 2019-11-08 ENCOUNTER — ANTICOAGULATION MONITORING (OUTPATIENT)
Dept: VASCULAR LAB | Facility: MEDICAL CENTER | Age: 84
End: 2019-11-08

## 2019-11-08 DIAGNOSIS — Z86.711 HISTORY OF PULMONARY EMBOLISM: ICD-10-CM

## 2019-11-08 LAB — INR PPP: 2.3 (ref 2–3.5)

## 2019-11-08 NOTE — PROGRESS NOTES
Tried to call regarding a therapeutic INR. Phone rings and rings, answers and hangs up.   Will have to try again later.     Dejah Teague, PharmD

## 2019-11-09 NOTE — PROGRESS NOTES
Anticoagulation Summary  As of 2019    INR goal:   2.0-3.0   TTR:   82.9 % (1.9 y)   INR used for dosin.30 (2019)   Warfarin maintenance plan:   1.25 mg (2.5 mg x 0.5) every Mon, Thu; 2.5 mg (2.5 mg x 1) all other days   Weekly warfarin total:   15 mg   Plan last modified:   Claire CARDOSO Filter (10/11/2019)   Next INR check:   1/3/2020   Target end date:   Indefinite    Indications    History of pulmonary embolism [Z86.711]             Anticoagulation Episode Summary     INR check location:       Preferred lab:       Send INR reminders to:       Comments:   [facility]        Anticoagulation Care Providers     Provider Role Specialty Phone number    SHERLY Lees [facility] Cardiology [phone]        Anticoagulation Patient Findings      Spoke with pt and his wife.  INR is therapeutic.   Pt denies any unusual s/s of bleeding, bruising, clotting or any changes to diet or medications. Denies any etoh, cranberries, supplements, or illness.   Pt verifies warfarin weekly dosing.     Will have pt continue regimen    Repeat INR in 8 week(s).     Fidelina Hickey, PharmD

## 2020-01-15 ENCOUNTER — ANTICOAGULATION MONITORING (OUTPATIENT)
Dept: VASCULAR LAB | Facility: MEDICAL CENTER | Age: 85
End: 2020-01-15

## 2020-01-15 DIAGNOSIS — Z86.711 HISTORY OF PULMONARY EMBOLISM: ICD-10-CM

## 2020-01-15 LAB — INR PPP: 2.7 (ref 2–3.5)

## 2020-01-16 NOTE — PROGRESS NOTES
Anticoagulation Summary  As of 1/15/2020    INR goal:   2.0-3.0   TTR:   84.5 % (2.1 y)   INR used for dosin.70 (1/15/2020)   Warfarin maintenance plan:   1.25 mg (2.5 mg x 0.5) every Mon, Thu; 2.5 mg (2.5 mg x 1) all other days   Weekly warfarin total:   15 mg   Plan last modified:   Claire CARDOSO Filter (10/11/2019)   Next INR check:      Target end date:   Indefinite    Indications    History of pulmonary embolism [Z86.711]             Anticoagulation Episode Summary     INR check location:       Preferred lab:       Send INR reminders to:       Comments:   [facility]        Anticoagulation Care Providers     Provider Role Specialty Phone number    SHERLY Lees [facility] Cardiology [phone]        Anticoagulation Patient Findings    HPI:  Mani Louise, on anticoagulation therapy with warfarin for PE.  Changes to current medical/health status since last appt: none  Denies signs/symptoms of bleeding and/or thrombosis since the last appt.    Denies any interval changes to diet  Denies any interval changes to medications since last appt.   Denies any complications or cost restrictions with current therapy.   Confirmed dosing regimen.    A/P   INR therapeutic.     Discussed INR with Pt wife on the phone. Will continue current regimen.    Next INR in 8 week(s).    Edmond Kaiser, Pharmacy Intern

## 2020-03-05 ENCOUNTER — ANTICOAGULATION MONITORING (OUTPATIENT)
Dept: VASCULAR LAB | Facility: MEDICAL CENTER | Age: 85
End: 2020-03-05

## 2020-03-05 DIAGNOSIS — Z86.711 HISTORY OF PULMONARY EMBOLISM: ICD-10-CM

## 2020-03-05 LAB — INR PPP: 1.3 (ref 2–3.5)

## 2020-03-05 NOTE — PROGRESS NOTES
OP Telephone Anticoagulation Service Note    Date: 3/5/2020      Anticoagulation Summary  As of 3/5/2020    INR goal:   2.0-3.0   TTR:   82.3 % (2.2 y)   INR used for dosin.30! (3/5/2020)   Warfarin maintenance plan:   1.25 mg (2.5 mg x 0.5) every Mon, Thu; 2.5 mg (2.5 mg x 1) all other days   Weekly warfarin total:   15 mg   Plan last modified:   Claire Melendez (10/11/2019)   Next INR check:   3/12/2020   Target end date:   Indefinite    Indications    History of pulmonary embolism [Z86.711]             Anticoagulation Episode Summary     INR check location:       Preferred lab:       Send INR reminders to:       Comments:   [facility]        Anticoagulation Care Providers     Provider Role Specialty Phone number    SHERLY Lees [facility] Cardiology [phone]        Anticoagulation Patient Findings  Patient Findings     Positives:   Bruising (Bruised rib from a fall)    Negatives:   Signs/symptoms of thrombosis, Signs/symptoms of bleeding, Laboratory test error suspected, Change in health, Change in alcohol use, Change in activity, Upcoming invasive procedure, Emergency department visit, Upcoming dental procedure, Missed doses, Extra doses, Change in medications, Change in diet/appetite, Hospital admission, Other complaints            Plan: Patient is SUB-therapeutic today. Confirmed dosing regimen with patients son. Patient denies any changes in medications or diet. Patient denies any signs or symptoms of bleeding or clotting. Patient did have a fall and bruised his ribs, but is healing properly per son. Instructed patient to call clinic if any unusual bleeding or bruising occurs. Instructed that patient bolus with 1 tablet X 1 dose (2.5mg) and then continue dosing as outlined. Will follow-up with patient in 1 week(s).    Spoke to Claire Melendez regarding change.    Joanna Wilks, Pharmacy Intern

## 2020-03-11 ENCOUNTER — ANTICOAGULATION MONITORING (OUTPATIENT)
Dept: VASCULAR LAB | Facility: MEDICAL CENTER | Age: 85
End: 2020-03-11

## 2020-03-11 DIAGNOSIS — Z86.711 HISTORY OF PULMONARY EMBOLISM: ICD-10-CM

## 2020-03-11 LAB — INR PPP: 3.3 (ref 2–3.5)

## 2020-03-11 NOTE — PROGRESS NOTES
Attempted to call patient 2x but patient is extremely [place] and asked if I could call back in an hour to speak to his wife.     3/11/20 3:40pm  Syed MillsD    Anticoagulation Summary  As of 3/11/2020    INR goal:   2.0-3.0   TTR:   82.1 % (2.2 y)   INR used for dosing:   3.30! (3/11/2020)   Warfarin maintenance plan:   1.25 mg (2.5 mg x 0.5) every Mon, Thu; 2.5 mg (2.5 mg x 1) all other days   Weekly warfarin total:   15 mg   Plan last modified:   Claire M Filter (10/11/2019)   Next INR check:   3/18/2020   Target end date:   Indefinite    Indications    History of pulmonary embolism [Z86.711]             Anticoagulation Episode Summary     INR check location:       Preferred lab:       Send INR reminders to:       Comments:   [facility]        Anticoagulation Care Providers     Provider Role Specialty Phone number    SHERLY Lees [facility] Cardiology [phone]        Anticoagulation Patient Findings          HPI:  Mani Louise, on anticoagulation therapy with warfarin for PE  Changes to current medical/health status since last appt: none  Denies signs/symptoms of bleeding and/or thrombosis since the last appt.    Denies any interval changes to diet  Denies any interval changes to medications since last appt.   Denies any complications or cost restrictions with current therapy.     A/P   INR  SUPRA-therapeutic.   Pt declines my suggestion to reduce warfarin dose, but agrees to test again in 7 days.     Next INR in 1 week(s).    Jared Thornton, PharmD

## 2020-03-18 ENCOUNTER — ANTICOAGULATION MONITORING (OUTPATIENT)
Dept: VASCULAR LAB | Facility: MEDICAL CENTER | Age: 85
End: 2020-03-18

## 2020-03-18 DIAGNOSIS — Z86.711 HISTORY OF PULMONARY EMBOLISM: ICD-10-CM

## 2020-03-18 LAB — INR PPP: 2.5 (ref 2–3.5)

## 2020-03-18 NOTE — PROGRESS NOTES
Anticoagulation Summary  As of 3/18/2020    INR goal:   2.0-3.0   TTR:   81.9 % (2.2 y)   INR used for dosin.50 (3/18/2020)   Warfarin maintenance plan:   1.25 mg (2.5 mg x 0.5) every Mon, Thu; 2.5 mg (2.5 mg x 1) all other days   Weekly warfarin total:   15 mg   Plan last modified:   Claire CARDOSO Filter (10/11/2019)   Next INR check:   2020   Target end date:   Indefinite    Indications    History of pulmonary embolism [Z86.711]             Anticoagulation Episode Summary     INR check location:       Preferred lab:       Send INR reminders to:       Comments:   [facility]        Anticoagulation Care Providers     Provider Role Specialty Phone number    SHERLY Lees [facility] Cardiology [phone]        Anticoagulation Patient Findings    Spoke with patient today regarding therapeutic INR of 2.5.  Patient denies any signs/symptoms of bruising or bleeding or any changes in diet and medications.  Instructed patient to call clinic with any questions or concerns.  Pt is to continue with current warfarin dosing regimen.  Follow up in 2 weeks, to reduce risk of adverse events related to this high risk medication,  Warfarin.    Keenan Griggs, PharmD, BCACP

## 2020-04-01 ENCOUNTER — ANTICOAGULATION MONITORING (OUTPATIENT)
Dept: VASCULAR LAB | Facility: MEDICAL CENTER | Age: 85
End: 2020-04-01

## 2020-04-01 LAB — INR PPP: 2 (ref 2–3.5)

## 2020-04-01 NOTE — PROGRESS NOTES
Anticoagulation Summary  As of 2020    INR goal:   2.0-3.0   TTR:   82.2 % (2.3 y)   INR used for dosin.00 (2020)   Warfarin maintenance plan:   1.25 mg (2.5 mg x 0.5) every Mon, Thu; 2.5 mg (2.5 mg x 1) all other days   Weekly warfarin total:   15 mg   Plan last modified:   Claire CARDOSO Filter (10/11/2019)   Next INR check:   2020   Target end date:   Indefinite    Indications    History of pulmonary embolism [Z86.711]             Anticoagulation Episode Summary     INR check location:       Preferred lab:       Send INR reminders to:       Comments:   [facility]        Anticoagulation Care Providers     Provider Role Specialty Phone number    SHERLY Lees [facility] Cardiology [phone]        Anticoagulation Patient Findings  Patient Findings     Negatives:   Signs/symptoms of thrombosis, Signs/symptoms of bleeding, Laboratory test error suspected, Change in health, Change in alcohol use, Change in activity, Upcoming invasive procedure, Emergency department visit, Upcoming dental procedure, Missed doses, Extra doses, Change in medications, Change in diet/appetite, Hospital admission, Bruising, Other complaints        Spoke with patient today regarding therapeutic INR of 2.0.  Patient denies any signs/symptoms of bruising or bleeding or any changes in diet and medications.  Instructed patient to call clinic with any questions or concerns.  Pt is to continue with current warfarin dosing regimen.  Follow up in 4 weeks, to reduce risk of adverse events related to this high risk medication,  Warfarin.    Keenan Griggs, PharmD, BCACP

## 2020-04-29 ENCOUNTER — ANTICOAGULATION MONITORING (OUTPATIENT)
Dept: VASCULAR LAB | Facility: MEDICAL CENTER | Age: 85
End: 2020-04-29

## 2020-04-29 DIAGNOSIS — Z86.711 HISTORY OF PULMONARY EMBOLISM: ICD-10-CM

## 2020-04-29 LAB — INR PPP: 2.3 (ref 2–3.5)

## 2020-04-29 NOTE — PROGRESS NOTES
Anticoagulation Summary  As of 2020    INR goal:   2.0-3.0   TTR:   82.8 % (2.4 y)   INR used for dosin.30 (2020)   Warfarin maintenance plan:   1.25 mg (2.5 mg x 0.5) every Mon, Thu; 2.5 mg (2.5 mg x 1) all other days   Weekly warfarin total:   15 mg   Plan last modified:   Claire CARDOSO Filter (10/11/2019)   Next INR check:   6/10/2020   Target end date:   Indefinite    Indications    History of pulmonary embolism [Z86.711]             Anticoagulation Episode Summary     INR check location:       Preferred lab:       Send INR reminders to:       Comments:   [facility]        Anticoagulation Care Providers     Provider Role Specialty Phone number    SHERLY Lees [facility] Cardiology [phone]        Anticoagulation Patient Findings  Patient Findings     Negatives:   Signs/symptoms of thrombosis, Signs/symptoms of bleeding, Laboratory test error suspected, Change in health, Change in alcohol use, Change in activity, Upcoming invasive procedure, Emergency department visit, Upcoming dental procedure, Missed doses, Extra doses, Change in medications, Change in diet/appetite, Hospital admission, Bruising, Other complaints        Spoke with patients wife today regarding therapeutic INR of 2.3.  Patient denies any signs/symptoms of bruising or bleeding or any changes in diet and medications.  Instructed patient to call clinic with any questions or concerns.  Pt is to continue with current warfarin dosing regimen.  Follow up in 6 weeks, to reduce risk of adverse events related to this high risk medication,  Warfarin.    Keenan Griggs, PharmD, BCACP

## 2020-06-10 ENCOUNTER — ANTICOAGULATION MONITORING (OUTPATIENT)
Dept: VASCULAR LAB | Facility: MEDICAL CENTER | Age: 85
End: 2020-06-10

## 2020-06-10 DIAGNOSIS — Z86.711 HISTORY OF PULMONARY EMBOLISM: ICD-10-CM

## 2020-06-10 LAB — INR PPP: 2.6 (ref 2–3.5)

## 2020-06-10 NOTE — PROGRESS NOTES
Anticoagulation Summary  As of 6/10/2020    INR goal:   2.0-3.0   TTR:   83.6 % (2.5 y)   INR used for dosin.60 (6/10/2020)   Warfarin maintenance plan:   1.25 mg (2.5 mg x 0.5) every Mon, Thu; 2.5 mg (2.5 mg x 1) all other days   Weekly warfarin total:   15 mg   Plan last modified:   Claire CARDOSO Filter (10/11/2019)   Next INR check:   2020   Target end date:   Indefinite    Indications    History of pulmonary embolism [Z86.711]             Anticoagulation Episode Summary     INR check location:       Preferred lab:       Send INR reminders to:       Comments:   [facility]        Anticoagulation Care Providers     Provider Role Specialty Phone number    SHERLY Lees [facility] Cardiology [phone]        Anticoagulation Patient Findings    Spoke with patient's wife by phone. Patient is therapeutic. She denies any medication or diet changes and any current symptoms of bleeding or thrombosis for her . Continue current regimen. Follow up in 7 weeks.    Gisel HEARD

## 2020-07-28 ENCOUNTER — ANTICOAGULATION MONITORING (OUTPATIENT)
Dept: VASCULAR LAB | Facility: MEDICAL CENTER | Age: 85
End: 2020-07-28

## 2020-07-28 DIAGNOSIS — Z86.711 HISTORY OF PULMONARY EMBOLISM: ICD-10-CM

## 2020-07-28 LAB — INR PPP: 2.1 (ref 2–3.5)

## 2020-07-28 RX ORDER — WARFARIN SODIUM 2.5 MG/1
1.25-2.5 TABLET ORAL DAILY
Qty: 90 TAB | Refills: 1 | Status: SHIPPED | OUTPATIENT
Start: 2020-07-28

## 2020-07-28 NOTE — PROGRESS NOTES
Anticoagulation Summary  As of 2020    INR goal:   2.0-3.0   TTR:   84.4 % (2.6 y)   INR used for dosin.10 (2020)   Warfarin maintenance plan:   1.25 mg (2.5 mg x 0.5) every Mon, Thu; 2.5 mg (2.5 mg x 1) all other days   Weekly warfarin total:   15 mg   Plan last modified:   Claire M Filter (10/11/2019)   Next INR check:      Target end date:   Indefinite    Indications    History of pulmonary embolism [Z86.711]             Anticoagulation Episode Summary     INR check location:       Preferred lab:       Send INR reminders to:       Comments:   [facility]        Anticoagulation Care Providers     Provider Role Specialty Phone number    SHERLY Lees [facility] Cardiology [phone]        Anticoagulation Patient Findings      INR  2.10 - therapeutic     Spoke to patient's wife, Nydia and let her know Mani's INR is therapeutic.     Asked patient to please call the anticoagulation clinic if patient has any signs/symptoms of bleeding and/or thrombosis or any changes to diet or medications.       Nydia requested for refills for Coumadin. Candy sent the request to the patient's pharmacy.      Next INR to be reported in 4 weeks.       Елена Lopez, PharmD

## 2020-08-25 DIAGNOSIS — Z79.01 CHRONIC ANTICOAGULATION: ICD-10-CM

## 2020-08-26 ENCOUNTER — ANTICOAGULATION MONITORING (OUTPATIENT)
Dept: VASCULAR LAB | Facility: MEDICAL CENTER | Age: 85
End: 2020-08-26

## 2020-08-26 DIAGNOSIS — Z86.711 HISTORY OF PULMONARY EMBOLISM: ICD-10-CM

## 2020-08-26 LAB — INR PPP: 2.5 (ref 2–3.5)

## 2020-08-26 NOTE — PROGRESS NOTES
Anticoagulation Summary  As of 2020    INR goal:  2.0-3.0   TTR:  84.9 % (2.7 y)   INR used for dosin.50 (2020)   Warfarin maintenance plan:  1.25 mg (2.5 mg x 0.5) every Mon, Thu; 2.5 mg (2.5 mg x 1) all other days   Weekly warfarin total:  15 mg   Plan last modified:  Claire CARDOSO Filter (10/11/2019)   Next INR check:  10/7/2020   Target end date:  Indefinite    Indications    History of pulmonary embolism [Z86.711]             Anticoagulation Episode Summary     INR check location:      Preferred lab:      Send INR reminders to:      Comments:  [facility]        Anticoagulation Care Providers     Provider Role Specialty Phone number    SHERLY Lees [facility] Cardiology [phone]           Spoke with patient today regarding therapeutic INR of 2.5.  Patient denies any signs/symptoms of bruising or bleeding or any changes in diet and medications.  Instructed patient to call clinic with any questions or concerns.    Pt is to continue with current warfarin dosing regimen.    Follow up in 6 weeks, to reduce risk of adverse events related to this high risk medication,  Warfarin.    Julio Cesar Rehman, Pharmacy Intern

## 2020-10-07 ENCOUNTER — ANTICOAGULATION MONITORING (OUTPATIENT)
Dept: VASCULAR LAB | Facility: MEDICAL CENTER | Age: 85
End: 2020-10-07

## 2020-10-07 DIAGNOSIS — Z86.711 HISTORY OF PULMONARY EMBOLISM: ICD-10-CM

## 2020-10-07 LAB — INR PPP: 2.5 (ref 2–3.5)

## 2020-10-07 NOTE — PROGRESS NOTES
Anticoagulation Summary  As of 10/7/2020    INR goal:  2.0-3.0   TTR:  85.5 % (2.8 y)   INR used for dosin.50 (10/7/2020)   Warfarin maintenance plan:  1.25 mg (2.5 mg x 0.5) every Mon, Thu; 2.5 mg (2.5 mg x 1) all other days   Weekly warfarin total:  15 mg   Plan last modified:  Claire CARDOSO Filter (10/11/2019)   Next INR check:  2020   Target end date:  Indefinite    Indications    History of pulmonary embolism [Z86.711]             Anticoagulation Episode Summary     INR check location:      Preferred lab:      Send INR reminders to:      Comments:  [facility]        Anticoagulation Care Providers     Provider Role Specialty Phone number    SHERLY Lees [facility] Cardiology [phone]        Anticoagulation Patient Findings  Patient Findings     Negatives:  Signs/symptoms of thrombosis, Signs/symptoms of bleeding, Laboratory test error suspected, Change in health, Change in alcohol use, Change in activity, Upcoming invasive procedure, Emergency department visit, Upcoming dental procedure, Missed doses, Extra doses, Change in medications, Change in diet/appetite, Hospital admission, Bruising, Other complaints         Spoke with patient's wife today regarding therapeutic INR of 2.5.  She denies any signs/symptoms of bruising or bleeding or any changes in diet and medications. Instructed her to call clinic with any questions or concerns.    Pt is to continue with current warfarin dosing regimen.    Follow up in 6 weeks, to reduce risk of adverse events related to this high risk medication,  Warfarin.    Lulu Penn, Pharmacy Intern

## 2020-10-29 ENCOUNTER — TELEPHONE (OUTPATIENT)
Dept: VASCULAR LAB | Facility: MEDICAL CENTER | Age: 85
End: 2020-10-29

## 2020-10-29 NOTE — TELEPHONE ENCOUNTER
S/w pt's son. Date of procedure has not been set yet.  Pt has cardiology appt on 11/5. Pt's son will update us with procedure date so we can facilitate perioperative anticoagulation management.    Fidelina Hickey, LinaD

## 2020-10-29 NOTE — TELEPHONE ENCOUNTER
----- Message from Jazzmine Umana sent at 10/29/2020 10:21 AM PDT -----  Regarding: [place] Call  Patient is having a TAVR procedure done in mid November and Dr. Delatorre wants INR between 1.5 to 2.0. The clinic just wanted to let you know. If you have any questions please call office at [phone].

## 2020-10-30 LAB — INR PPP: 2.1 (ref 2–3.5)

## 2020-11-02 ENCOUNTER — ANTICOAGULATION MONITORING (OUTPATIENT)
Dept: VASCULAR LAB | Facility: MEDICAL CENTER | Age: 85
End: 2020-11-02

## 2020-11-02 DIAGNOSIS — Z86.711 HISTORY OF PULMONARY EMBOLISM: ICD-10-CM

## 2020-11-03 NOTE — PROGRESS NOTES
Anticoagulation Summary  As of 2020    INR goal:  2.0-3.0   TTR:  85.8 % (2.9 y)   INR used for dosin.10 (10/30/2020)   Warfarin maintenance plan:  1.25 mg (2.5 mg x 0.5) every Mon, Thu; 2.5 mg (2.5 mg x 1) all other days   Weekly warfarin total:  15 mg   Plan last modified:  Claire CARDOSO Filter (10/11/2019)   Next INR check:  2020   Target end date:  Indefinite    Indications    History of pulmonary embolism [Z86.711]             Anticoagulation Episode Summary     INR check location:      Preferred lab:      Send INR reminders to:      Comments:  [facility]        Anticoagulation Care Providers     Provider Role Specialty Phone number    SHERLY Lees [facility] Cardiology [phone]        Anticoagulation Patient Findings      Spoke with patient's son to report a therapeutic INR.    Pt instructed to continue with current warfarin dosing regimen, confirms dosing.   Pt denies any s/s of bleeding, bruising, clotting or any changes to diet or medication.      Pt will be holding warfarin starting tomorrow for an angiogram on , then depending on the angiogram, he will get his TAVR a few days following . Dr. Delatorre wants his INR to be 1.5-2.0. Pt's son will call our office with the update.     Fidelina Hickey, LinaD

## 2020-11-18 ENCOUNTER — ANTICOAGULATION MONITORING (OUTPATIENT)
Dept: VASCULAR LAB | Facility: MEDICAL CENTER | Age: 85
End: 2020-11-18

## 2020-11-18 DIAGNOSIS — Z86.711 HISTORY OF PULMONARY EMBOLISM: ICD-10-CM

## 2020-11-18 LAB — INR PPP: 1.8 (ref 2–3.5)

## 2020-11-18 NOTE — PROGRESS NOTES
Anticoagulation Summary  As of 2020    INR goal:  2.0-3.0   TTR:  84.9 % (2.9 y)   INR used for dosin.80 (2020)   Warfarin maintenance plan:  1.25 mg (2.5 mg x 0.5) every Mon, Thu; 2.5 mg (2.5 mg x 1) all other days   Weekly warfarin total:  15 mg   Plan last modified:  Claire CARDOSO Filter (10/11/2019)   Next INR check:  2020   Target end date:  Indefinite    Indications    History of pulmonary embolism [Z86.711]             Anticoagulation Episode Summary     INR check location:      Preferred lab:      Send INR reminders to:      Comments:  [facility]        Anticoagulation Care Providers     Provider Role Specialty Phone number    SHERLY Lees [facility] Cardiology [phone]        Anticoagulation Patient Findings  Patient Findings     Negatives:  Signs/symptoms of thrombosis, Signs/symptoms of bleeding, Laboratory test error suspected, Change in health, Change in alcohol use, Change in activity, Upcoming invasive procedure, Emergency department visit, Upcoming dental procedure, Missed doses, Extra doses, Change in medications, Change in diet/appetite, Hospital admission, Bruising, Other complaints         Spoke with patient today regarding subtherapeutic INR of 1.8.  Patient denies any signs/symptoms of bruising or bleeding or any changes in diet and medications.  Instructed patient to call clinic with any questions or concerns.  Instructed patient to bolus with 3.75mg X 1, then resume current warfarin regimen.  Follow up in 2 weeks, to reduce risk of adverse events related to this high risk medication,  Warfarin.    Keenan Griggs, PharmD, BCACP

## 2020-12-03 ENCOUNTER — ANTICOAGULATION MONITORING (OUTPATIENT)
Dept: VASCULAR LAB | Facility: MEDICAL CENTER | Age: 85
End: 2020-12-03

## 2020-12-03 DIAGNOSIS — Z86.711 HISTORY OF PULMONARY EMBOLISM: ICD-10-CM

## 2020-12-03 LAB — INR PPP: 1.7 (ref 2–3.5)

## 2020-12-04 ENCOUNTER — ANTICOAGULATION MONITORING (OUTPATIENT)
Dept: VASCULAR LAB | Facility: MEDICAL CENTER | Age: 85
End: 2020-12-04

## 2020-12-04 DIAGNOSIS — Z86.711 HISTORY OF PULMONARY EMBOLISM: ICD-10-CM

## 2020-12-04 NOTE — PROGRESS NOTES
Anticoagulation Summary  As of 12/3/2020    INR goal:  2.0-3.0   TTR:  83.7 % (3 y)   INR used for dosin.70 (12/3/2020)   Warfarin maintenance plan:  1.25 mg (2.5 mg x 0.5) every Mon; 2.5 mg (2.5 mg x 1) all other days   Weekly warfarin total:  16.25 mg   Plan last modified:  Deepak Nicole PharmD (12/3/2020)   Next INR check:  2020   Target end date:  Indefinite    Indications    History of pulmonary embolism [Z86.711]             Anticoagulation Episode Summary     INR check location:      Preferred lab:      Send INR reminders to:      Comments:  [facility]        Anticoagulation Care Providers     Provider Role Specialty Phone number    SHERLY Lees [facility] Cardiology [phone]        Anticoagulation Patient Findings    Spoke to patient on the phone.   INR  sub-therapeutic.   Denies signs/symptoms of bleeding and/or thrombosis.   Denies changes to diet or medications.   Follow up appointment in 2 week(s).    Increase weekly warfarin dose as noted    Deepak Nicole, Marlon, MS, BCACP, LCC    This note was created using voice recognition software (Dragon). The accuracy of the dictation is limited by the abilities of the software. I have reviewed the note prior to signing, however some errors in grammar and context are still possible. If you have any questions related to this note please do not hesitate to contact our office.

## 2020-12-23 ENCOUNTER — ANTICOAGULATION MONITORING (OUTPATIENT)
Dept: VASCULAR LAB | Facility: MEDICAL CENTER | Age: 85
End: 2020-12-23

## 2020-12-23 DIAGNOSIS — Z86.711 HISTORY OF PULMONARY EMBOLISM: ICD-10-CM

## 2020-12-23 LAB — INR PPP: 2.4 (ref 2–3.5)

## 2020-12-24 NOTE — PROGRESS NOTES
Anticoagulation Summary  As of 2020    INR goal:  2.0-3.0   TTR:  83.2 % (3 y)   INR used for dosin.40 (2020)   Warfarin maintenance plan:  1.25 mg (2.5 mg x 0.5) every Mon; 2.5 mg (2.5 mg x 1) all other days   Weekly warfarin total:  16.25 mg   Plan last modified:  Deepak Nicole PharmD (12/3/2020)   Next INR check:  2021   Target end date:  Indefinite    Indications    History of pulmonary embolism [Z86.711]             Anticoagulation Episode Summary     INR check location:      Preferred lab:      Send INR reminders to:      Comments:  [facility]        Anticoagulation Care Providers     Provider Role Specialty Phone number    SHERLY Lees [facility] Cardiology [phone]        Anticoagulation Patient Findings      Spoke with patient's wife via telephone to report a therapeutic INR of 2.4  Confirmed the current warfarin dosing regimen and patient compliance.   Patient denies any interval changes to diet and/or medications.   Patient denies any signs/symptoms of bleeding or clotting.   Pt denies any s/s of bleeding, bruising, clotting or any changes to diet or medication.      Patient instructed to continue with the current warfarin dosing regimen, and asked to follow up again in 2 weeks prior to extending INR interval     Siddhartha Tolbert, LinaD

## 2021-01-06 ENCOUNTER — ANTICOAGULATION MONITORING (OUTPATIENT)
Dept: VASCULAR LAB | Facility: MEDICAL CENTER | Age: 86
End: 2021-01-06

## 2021-01-06 DIAGNOSIS — Z86.711 HISTORY OF PULMONARY EMBOLISM: ICD-10-CM

## 2021-01-06 LAB — INR PPP: 2.3 (ref 2–3.5)

## 2021-01-06 NOTE — PROGRESS NOTES
OP Anticoagulation Service Note    Date: 2021    Anticoagulation Summary  As of 2021    INR goal:  2.0-3.0   TTR:  83.4 % (3 y)   INR used for dosin.30 (2021)   Warfarin maintenance plan:  1.25 mg (2.5 mg x 0.5) every Mon; 2.5 mg (2.5 mg x 1) all other days   Weekly warfarin total:  16.25 mg   Plan last modified:  Deepak Nicole, PharmD (12/3/2020)   Next INR check:  2021   Target end date:  Indefinite    Indications    History of pulmonary embolism [Z86.711]             Anticoagulation Episode Summary     INR check location:      Preferred lab:      Send INR reminders to:      Comments:  [facility]        Anticoagulation Care Providers     Provider Role Specialty Phone number    SHERLY Lees [facility] Cardiology [phone]        Anticoagulation Patient Findings      HPI:     This appointment was conducted over the phone today.    The reason for today's call is to prevent morbidity and mortality from a blood clot and/or stroke and to reduce the risk of bleeding while on a anticoagulant.     PCP:  Shan Richey M.D.  [address]  [place] [address]      Assessment:     • INR  therapeutic.   • Confirmed warfarin dosing regimen: Yes  • Interval Changes with foods rich in vitamin K: No  • Interval Changes in ETOH or cranberries:   No  • Interval Changes in smoking status:  No  • S/S of bleeding or bruising:  No  • Signs/symptoms  thrombosis since the last appt:  No  • Any upcoming procedures that require stopping warfarin and/or using bridge therapy: None  • Interval Changes in medication:  No         Current Outpatient Medications:   •  warfarin, 1.25-2.5 mg, Oral, DAILY  •  dutasteride, 0.5 mg, Oral, DAILY  •  triazolam, 0.5 mg, Oral, HS PRN      Plan:     • Pt is to continue with current warfarin dosing regimen.    Follow-up:     • Our protocol suggests we test in 3 weeks.      • This decision was made using shared decision making with the pt and benefits  vs risks were discussed.      Additional information discussed with patient:     • Pt educated to contact our clinic with any changes in medications or s/s of bleeding or thrombosis.  • Education was provided today regarding tips to reduce their bleed risk and dietary constraints while on a anticoagulant.    National recommendations regarding anticoagulation therapy:     The CHEST guidelines recommends frequent INR monitoring at regular intervals (a few days up to a max of 12 weeks) to ensure patients are on the proper dose of warfarin, and patients are not having any complications from therapy.  INRs can dramatically change over a short time period due to diet, medications, and medical conditions.     Milo Durant, Pharmacy Intern  [facility]  Phone [phone] fax [phone]    This note was created using voice recognition software (Dragon). The accuracy of the dictation is limited by the abilities of the software. I have reviewed the note prior to signing, however some errors in grammar and context are still possible. If you have any questions related to this note please do not hesitate to contact our office.

## 2021-01-14 DIAGNOSIS — Z23 NEED FOR VACCINATION: ICD-10-CM

## 2021-02-03 ENCOUNTER — ANTICOAGULATION MONITORING (OUTPATIENT)
Dept: VASCULAR LAB | Facility: MEDICAL CENTER | Age: 86
End: 2021-02-03

## 2021-02-03 DIAGNOSIS — Z86.711 HISTORY OF PULMONARY EMBOLISM: ICD-10-CM

## 2021-02-03 LAB — INR PPP: 2.1 (ref 2–3.5)

## 2021-02-04 NOTE — PROGRESS NOTES
OP Anticoagulation Service Note    Date: 2/3/2021    Anticoagulation Summary  As of 2/3/2021    INR goal:  2.0-3.0   TTR:  83.9 % (3.1 y)   INR used for dosin.10 (2/3/2021)   Warfarin maintenance plan:  1.25 mg (2.5 mg x 0.5) every Mon; 2.5 mg (2.5 mg x 1) all other days   Weekly warfarin total:  16.25 mg   Plan last modified:  Deepak Nicole, PharmD (12/3/2020)   Next INR check:  3/3/2021   Target end date:  Indefinite    Indications    History of pulmonary embolism [Z86.711]             Anticoagulation Episode Summary     INR check location:      Preferred lab:      Send INR reminders to:      Comments:  [facility]        Anticoagulation Care Providers     Provider Role Specialty Phone number    SHERLY Lees [facility] Cardiology [phone]        Anticoagulation Patient Findings  Patient Findings     Negatives:  Signs/symptoms of thrombosis, Signs/symptoms of bleeding, Laboratory test error suspected, Change in health, Change in alcohol use, Change in activity, Upcoming invasive procedure, Emergency department visit, Upcoming dental procedure, Missed doses, Extra doses, Change in medications, Change in diet/appetite, Hospital admission, Bruising, Other complaints          HPI:     This appointment was conducted over the phone today.    The reason for today's call is to prevent morbidity and mortality from a blood clot and/or stroke and to reduce the risk of bleeding while on a anticoagulant.     PCP:  Shan Richey M.D.  [address]  [place] [address]      Assessment:     • INR  therapeutic.   • Confirmed warfarin dosing regimen: Yes  • Interval Changes with foods rich in vitamin K: No  • Interval Changes in ETOH or cranberries:   No  • Interval Changes in smoking status:  No  • S/S of bleeding or bruising:  No  • Signs/symptoms  thrombosis since the last appt:  No  • Any upcoming procedures that require stopping warfarin and/or using bridge therapy: None  • Interval  Changes in medication:  No         Current Outpatient Medications:   •  warfarin, 1.25-2.5 mg, Oral, DAILY  •  dutasteride, 0.5 mg, Oral, DAILY  •  triazolam, 0.5 mg, Oral, HS PRN      Plan:     • Pt is to continue with current warfarin dosing regimen.      Follow-up:     • Our protocol suggests we test in 4 weeks.        • This decision was made using shared decision making with the pt and benefits vs risks were discussed.      Additional information discussed with patient:     • Pt educated to contact our clinic with any changes in medications or s/s of bleeding or thrombosis.  • Education was provided today regarding tips to reduce their bleed risk and dietary constraints while on a anticoagulant.    National recommendations regarding anticoagulation therapy:     The CHEST guidelines recommends frequent INR monitoring at regular intervals (a few days up to a max of 12 weeks) to ensure patients are on the proper dose of warfarin, and patients are not having any complications from therapy.  INRs can dramatically change over a short time period due to diet, medications, and medical conditions.     Milo Durant, Pharmacy Intern  [facility]  Phone [phone] fax [phone]    This note was created using voice recognition software (Dragon). The accuracy of the dictation is limited by the abilities of the software. I have reviewed the note prior to signing, however some errors in grammar and context are still possible. If you have any questions related to this note please do not hesitate to contact our office.

## 2021-03-03 ENCOUNTER — ANTICOAGULATION MONITORING (OUTPATIENT)
Dept: VASCULAR LAB | Facility: MEDICAL CENTER | Age: 86
End: 2021-03-03

## 2021-03-03 DIAGNOSIS — Z86.711 HISTORY OF PULMONARY EMBOLISM: ICD-10-CM

## 2021-03-03 LAB — INR PPP: 2.8 (ref 2–3.5)

## 2021-03-03 NOTE — PROGRESS NOTES
Anticoagulation Summary  As of 3/3/2021    INR goal:  2.0-3.0   TTR:  84.2 % (3.2 y)   INR used for dosin.80 (3/3/2021)   Warfarin maintenance plan:  1.25 mg (2.5 mg x 0.5) every Mon; 2.5 mg (2.5 mg x 1) all other days   Weekly warfarin total:  16.25 mg   Plan last modified:  Deepak Nicole, PharmD (12/3/2020)   Next INR check:  2021   Target end date:  Indefinite    Indications    History of pulmonary embolism [Z86.711]             Anticoagulation Episode Summary     INR check location:      Preferred lab:      Send INR reminders to:      Comments:  [facility]        Anticoagulation Care Providers     Provider Role Specialty Phone number    SHERLY Lees [facility] Cardiology [phone]        Anticoagulation Patient Findings       Left voicemail message to report a  therapeutic INR of 2.8.  Pt to continue with current warfarin dosing regimen. Requested pt contact the clinic for any s/s of unusual bleeding, bruising, clotting or any changes to diet or medication.  Follow up in 6 weeks, to reduce the risk of adverse events related to this high risk medication, warfarin.    Claire Melendez, Clinical Pharmacist

## 2021-04-14 ENCOUNTER — ANTICOAGULATION MONITORING (OUTPATIENT)
Dept: VASCULAR LAB | Facility: MEDICAL CENTER | Age: 86
End: 2021-04-14

## 2021-04-14 DIAGNOSIS — Z86.711 HISTORY OF PULMONARY EMBOLISM: ICD-10-CM

## 2021-04-14 LAB — INR PPP: 2.6 (ref 2–3.5)

## 2021-04-14 NOTE — PROGRESS NOTES
Anticoagulation Summary  As of 2021    INR goal:  2.0-3.0   TTR:  84.8 % (3.3 y)   INR used for dosin.60 (2021)   Warfarin maintenance plan:  1.25 mg (2.5 mg x 0.5) every Mon; 2.5 mg (2.5 mg x 1) all other days   Weekly warfarin total:  16.25 mg   Plan last modified:  Deepak Nicole, PharmD (12/3/2020)   Next INR check:  2021   Target end date:  Indefinite    Indications    History of pulmonary embolism [Z86.711]             Anticoagulation Episode Summary     INR check location:      Preferred lab:      Send INR reminders to:      Comments:  [facility]        Anticoagulation Care Providers     Provider Role Specialty Phone number    SHERLY Lees [facility] Cardiology [phone]        Anticoagulation Patient Findings          Left a message to report a therapeutic INR of 2.6.  Pt to continue with current warfarin dosing regimen. Requested pt contact the clinic for any s/s of unusual bleeding, bruising, clotting or any changes to diet or medication. FU 7 weeks.    Jared Thornton, PharmD

## 2021-06-17 ENCOUNTER — APPOINTMENT (RX ONLY)
Dept: URBAN - METROPOLITAN AREA CLINIC 38 | Facility: CLINIC | Age: 86
Setting detail: DERMATOLOGY
End: 2021-06-17

## 2021-06-17 DIAGNOSIS — L89 PRESSURE ULCER: ICD-10-CM | Status: STABLE

## 2021-06-17 PROBLEM — D48.5 NEOPLASM OF UNCERTAIN BEHAVIOR OF SKIN: Status: ACTIVE | Noted: 2021-06-17

## 2021-06-17 PROBLEM — L97.319 NON-PRESSURE CHRONIC ULCER OF RIGHT ANKLE WITH UNSPECIFIED SEVERITY: Status: ACTIVE | Noted: 2021-06-17

## 2021-06-17 PROCEDURE — ? COUNSELING

## 2021-06-17 PROCEDURE — 99212 OFFICE O/P EST SF 10 MIN: CPT | Mod: 25

## 2021-06-17 PROCEDURE — ? BIOPSY BY SHAVE METHOD

## 2021-06-17 PROCEDURE — 11102 TANGNTL BX SKIN SINGLE LES: CPT

## 2021-06-17 ASSESSMENT — LOCATION DETAILED DESCRIPTION DERM: LOCATION DETAILED: RIGHT POSTERIOR LATERAL MALLEOLUS

## 2021-06-17 ASSESSMENT — LOCATION ZONE DERM: LOCATION ZONE: LEG

## 2021-06-17 ASSESSMENT — AREA OF ULCER IN CM2: TOTAL AREA OF ULCER IN CM2: 2

## 2021-06-17 ASSESSMENT — LOCATION SIMPLE DESCRIPTION DERM: LOCATION SIMPLE: RIGHT ANKLE

## 2021-06-17 NOTE — PROCEDURE: COUNSELING
Detail Level: Detailed
Patient Specific Counseling (Will Not Stick From Patient To Patient): Duoderm placed on lesion

## 2021-06-17 NOTE — PROCEDURE: BIOPSY BY SHAVE METHOD
Detail Level: Detailed
Depth Of Biopsy: dermis
Was A Bandage Applied: Yes
Size Of Lesion In Cm: 0
Biopsy Type: H and E
Biopsy Method: Priya lopez
Anesthesia Type: 1% lidocaine with epinephrine
Anesthesia Volume In Cc: 0.5
Hemostasis: Drysol
Wound Care: Petrolatum
Dressing: bandage
Destruction After The Procedure: No
Type Of Destruction Used: Curettage
Curettage Text: The wound bed was treated with curettage after the biopsy was performed.
Cryotherapy Text: The wound bed was treated with cryotherapy after the biopsy was performed.
Electrodesiccation Text: The wound bed was treated with electrodesiccation after the biopsy was performed.
Electrodesiccation And Curettage Text: The wound bed was treated with electrodesiccation and curettage after the biopsy was performed.
Silver Nitrate Text: The wound bed was treated with silver nitrate after the biopsy was performed.
Lab: [id number]
Lab Facility: 
Consent: Written consent was obtained and risks were reviewed including but not limited to scarring, infection, bleeding, scabbing, incomplete removal, nerve damage and allergy to anesthesia.
Post-Care Instructions: I reviewed with the patient in detail post-care instructions. Patient is to keep the biopsy site dry overnight, and then apply bacitracin twice daily until healed. Patient may apply hydrogen peroxide soaks to remove any crusting.
Notification Instructions: Patient will be notified of biopsy results. However, patient instructed to call the office if not contacted within 2 weeks.
Billing Type: Third-Party Bill
Information: Selecting Yes will display possible errors in your note based on the variables you have selected. This validation is only offered as a suggestion for you. PLEASE NOTE THAT THE VALIDATION TEXT WILL BE REMOVED WHEN YOU FINALIZE YOUR NOTE. IF YOU WANT TO FAX A PRELIMINARY NOTE YOU WILL NEED TO TOGGLE THIS TO 'NO' IF YOU DO NOT WANT IT IN YOUR FAXED NOTE.

## 2021-06-23 ENCOUNTER — ANTICOAGULATION MONITORING (OUTPATIENT)
Dept: VASCULAR LAB | Facility: MEDICAL CENTER | Age: 86
End: 2021-06-23

## 2021-06-23 DIAGNOSIS — Z86.711 HISTORY OF PULMONARY EMBOLISM: ICD-10-CM

## 2021-07-07 ENCOUNTER — TELEPHONE (OUTPATIENT)
Dept: VASCULAR LAB | Facility: MEDICAL CENTER | Age: 86
End: 2021-07-07

## 2021-07-07 LAB — INR PPP: 3.8 (ref 2–3.5)

## 2021-07-07 NOTE — TELEPHONE ENCOUNTER
Left message for pt to have INR checked  2nd call  Letter sent  Claire Melendez, Clinical Pharmacist, CDE, CACP

## 2021-07-07 NOTE — LETTER
Mani Louise  [address]  [address]  [place] [address]    07/07/21    Dear Mani Louise ,    We have been unsuccessful in our attempts to contact you regarding your Anticoagulation Service appointments. Warfarin is a potent blood-thinning agent that requires monitoring to ensure that the dosage is correct for your body.  If it isn't, you could develop serious, sometimes life-threatening bleeding problems or life-threatening blood clots or stroke could result.    To monitor you effectively, we need to be able to communicate with you.  This is a requirement to be followed by our Service.       If you repeatedly fail to keep your lab appointments, you are at risk of being discharged from the Anticoagulation Service.    It is extremely important that you contact the clinic as soon as possible to arrange appropriate follow up.  We are open Monday-Friday 8 am until 5 pm.  You may reach our Service at [phone].           Sincerely,           Jared Thornton, PharmD, [facility]  Clinic Supervisor  [facility]  Outpatient Anticoagulation Service

## 2021-07-08 LAB — INR PPP: 3.6 (ref 2–3.5)

## 2021-07-09 ENCOUNTER — ANTICOAGULATION MONITORING (OUTPATIENT)
Dept: VASCULAR LAB | Facility: MEDICAL CENTER | Age: 86
End: 2021-07-09

## 2021-07-09 DIAGNOSIS — Z86.711 HISTORY OF PULMONARY EMBOLISM: ICD-10-CM

## 2021-07-09 LAB — INR PPP: 2.4 (ref 2–3.5)

## 2021-07-09 NOTE — PROGRESS NOTES
"Anticoagulation Summary  As of 2021    INR goal:  2.0-3.0   TTR:  85.8 % (3.6 y)   INR used for dosin.40 (2021)   Warfarin maintenance plan:  1.25 mg (2.5 mg x 0.5) every Mon; 2.5 mg (2.5 mg x 1) all other days   Weekly warfarin total:  16.25 mg   Plan last modified:  Deepak Nicole, PharmD (12/3/2020)   Next INR check:  2021   Target end date:  Indefinite    Indications    History of pulmonary embolism [Z86.711]             Anticoagulation Episode Summary     INR check location:      Preferred lab:      Send INR reminders to:      Comments:  [facility]        Anticoagulation Care Providers     Provider Role Specialty Phone number    SHERLY Lees [facility] Cardiology [phone]        Anticoagulation Patient Findings          HPI:  Mani Louise, on anticoagulation therapy with warfarin for PE  Changes to current medical/health status since last appt: none  Reports some blood in the urine but refuses to have it investigated by PCP, UC, or ER.   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   Denies any interval changes to diet  Denies any interval changes to medications since last appt.   Denies any complications or cost restrictions with current therapy.   Pt had INR's completed but they were not coming to our lab.  They were getting sent to the PCP Dr Gutierrez's office.  INR has been elevated.     A/P   INR  therapeutic.   Pt to hold warfarin today in the setting of active bleeding.  If bleeding stops he may restart warfarin and test again in 4 days.     New SO sent to [facility] as [facility] was sending INR results to PCP (who ordered new SO for INR)    Jared Thornton, PharmD   "

## 2021-07-14 ENCOUNTER — ANTICOAGULATION MONITORING (OUTPATIENT)
Dept: VASCULAR LAB | Facility: MEDICAL CENTER | Age: 86
End: 2021-07-14

## 2021-07-14 DIAGNOSIS — Z86.711 HISTORY OF PULMONARY EMBOLISM: ICD-10-CM

## 2021-07-14 LAB — INR PPP: 2.1 (ref 2–3.5)

## 2021-07-14 NOTE — PROGRESS NOTES
OP Telephone Anticoagulation Service Note    Date: 2021      Anticoagulation Summary  As of 2021    INR goal:  2.0-3.0   TTR:  81.4 % (3.6 y)   INR used for dosin.10 (2021)   Warfarin maintenance plan:  1.25 mg (2.5 mg x 0.5) every Mon; 2.5 mg (2.5 mg x 1) all other days   Weekly warfarin total:  16.25 mg   Plan last modified:  Lina WrayD (12/3/2020)   Next INR check:  2021   Target end date:  Indefinite    Indications    History of pulmonary embolism [Z86.711]             Anticoagulation Episode Summary     INR check location:      Preferred lab:      Send INR reminders to:      Comments:  [facility]        Anticoagulation Care Providers     Provider Role Specialty Phone number    SHERLY Lees Responsible Cardiology [phone]        Anticoagulation Patient Findings      INR therapeutic at 2.1.  Spoke w/ pt on phone.  Verified regimen w/ pt.  Instructed pt to continue on with current regimen.  NO s/s bleeding reported per pt.  NO changes in diet reported per pt.  NO changes in medications reported per pt.  Check INR in 1 week(s).  Instructed pt to call clinic at [phone] if there are any questions.  Pt stated understanding.    Pt is not on antiplatelet therapy.    Alan Patel, LinaD

## 2021-07-20 LAB — INR PPP: 3.2 (ref 2–3.5)

## 2021-07-21 ENCOUNTER — ANTICOAGULATION MONITORING (OUTPATIENT)
Dept: VASCULAR LAB | Facility: MEDICAL CENTER | Age: 86
End: 2021-07-21

## 2021-07-21 DIAGNOSIS — Z86.711 HISTORY OF PULMONARY EMBOLISM: ICD-10-CM

## 2021-07-21 NOTE — PROGRESS NOTES
Anticoagulation Summary  As of 7/21/2021    INR goal:  2.0-3.0   TTR:  81.4 % (3.6 y)   INR used for dosing:  3.20 (7/20/2021)   Warfarin maintenance plan:  1.25 mg (2.5 mg x 0.5) every Mon, Fri; 2.5 mg (2.5 mg x 1) all other days   Weekly warfarin total:  15 mg   Plan last modified:  Kelin Hatfield, Pharmacy Intern (7/21/2021)   Next INR check:  8/4/2021   Target end date:  Indefinite    Indications    History of pulmonary embolism [Z86.711]             Anticoagulation Episode Summary     INR check location:      Preferred lab:      Send INR reminders to:      Comments:  [facility]        Anticoagulation Care Providers     Provider Role Specialty Phone number    SHERLY Lees [facility] Cardiology [phone]        Anticoagulation Patient Findings  Patient Findings     Negatives:  Signs/symptoms of thrombosis, Signs/symptoms of bleeding, Laboratory test error suspected, Change in health, Change in alcohol use, Change in activity, Upcoming invasive procedure, Emergency department visit, Upcoming dental procedure, Missed doses, Extra doses, Change in medications, Change in diet/appetite, Hospital admission, Bruising, Other complaints           Spoke with patients wife today regarding supratherapeutic INR of 3.2.  Patient's wife denies any signs/symptoms of bruising or bleeding or any changes in diet and medications.  Instructed patient's wife to call clinic with any questions or concerns.    Pt is to continue with current warfarin dosing regimen.    Follow up in 2 weeks, to reduce risk of adverse events related to this high risk medication,  Warfarin.    Kelin Hatfield, Pharmacy Intern    Discussed with Marlon Rangel

## 2021-07-29 ENCOUNTER — ANTICOAGULATION MONITORING (OUTPATIENT)
Dept: CARDIOLOGY | Facility: MEDICAL CENTER | Age: 86
End: 2021-07-29

## 2021-07-29 DIAGNOSIS — Z86.711 HISTORY OF PULMONARY EMBOLISM: ICD-10-CM

## 2021-07-29 LAB — INR PPP: 3.7 (ref 2–3.5)

## 2021-07-29 NOTE — PROGRESS NOTES
Anticoagulation Summary  As of 7/29/2021    INR goal:  2.0-3.0   TTR:  80.9 % (3.6 y)   INR used for dosing:  3.70 (7/29/2021)   Warfarin maintenance plan:  1.25 mg (2.5 mg x 0.5) every Mon, Fri; 2.5 mg (2.5 mg x 1) all other days   Weekly warfarin total:  15 mg   Plan last modified:  Kelin Hatfield, Pharmacy Intern (7/21/2021)   Next INR check:  8/5/2021   Target end date:  Indefinite    Indications    History of pulmonary embolism [Z86.711]             Anticoagulation Episode Summary     INR check location:      Preferred lab:      Send INR reminders to:      Comments:  [facility]        Anticoagulation Care Providers     Provider Role Specialty Phone number    SHERLY Lees Responsible Cardiology [phone]        Anticoagulation Patient Findings      Spoke with pt.  INR is supratherapeutic.   Pt denies any unusual s/s of bleeding, bruising, clotting or any changes to diet or medications. Denies any etoh, cranberries, supplements, or illness.   Pt verifies warfarin weekly dosing.     Will have pt hold x 1 day then continue regimen    Repeat INR in 1 week(s).     Fidelina Hickey, PharmD

## 2021-08-02 ENCOUNTER — APPOINTMENT (RX ONLY)
Dept: URBAN - METROPOLITAN AREA CLINIC 31 | Facility: CLINIC | Age: 86
Setting detail: DERMATOLOGY
End: 2021-08-02

## 2021-08-02 PROCEDURE — ? MOHS SURGERY

## 2021-08-02 NOTE — HPI: PROCEDURE (MOHS)
Has The Growth Been Previously Biopsied?: has been previously biopsied
Body Location Override (Optional): Left mid temple

## 2021-08-02 NOTE — PROCEDURE: MOHS SURGERY
Chonodrocutaneous Helical Advancement Flap Text: The defect edges were debeveled with a #15 scalpel blade.  Given the location of the defect and the proximity to free margins a chondrocutaneous helical advancement flap was deemed most appropriate.  Using a sterile surgical marker, the appropriate advancement flap was drawn incorporating the defect and placing the expected incisions within the relaxed skin tension lines where possible.    The area thus outlined was incised deep to adipose tissue with a #15 scalpel blade.  The skin margins were undermined to an appropriate distance in all directions utilizing iris scissors.
Estimated Blood Loss (Cc): minimal
Special Stains Stage 15 - Results: Base On Clearance Noted Above
Stage 5: Number Of Blocks?: 0
Epidermal Closure: running cuticular
Bcc  Nodular Histology Text: There were numerous nodular aggregates of basaloid cells in the dermis with atypical cell morphology.
Mohs Rapid Report Verbiage: The area of clinically evident tumor was marked with skin marking ink and appropriately hatched.  The initial incision was made following the Mohs approach through the skin.  The specimen was taken to the lab, divided into the necessary number of pieces, chromacoded and processed according to the Mohs protocol.  This was repeated in successive stages until a tumor free defect was achieved.
Quadrant Reporting?: no
Bilobed Flap Text: The defect edges were debeveled with a #15 scalpel blade.  Given the location of the defect and the proximity to free margins a bilobe flap was deemed most appropriate.  Using a sterile surgical marker, an appropriate bilobe flap drawn around the defect.    The area thus outlined was incised deep to adipose tissue with a #15 scalpel blade.  The skin margins were undermined to an appropriate distance in all directions utilizing iris scissors.
O-T Advancement Flap Text: The defect edges were debeveled with a #15 scalpel blade.  Given the location of the defect, shape of the defect and the proximity to free margins an O-T advancement flap was deemed most appropriate.  Using a sterile surgical marker, an appropriate advancement flap was drawn incorporating the defect and placing the expected incisions within the relaxed skin tension lines where possible.    The area thus outlined was incised deep to adipose tissue with a #15 scalpel blade.  The skin margins were undermined to an appropriate distance in all directions utilizing iris scissors.
Nasal Turnover Hinge Flap Text: The defect edges were debeveled with a #15 scalpel blade.  Given the size, depth, location of the defect and the defect being full thickness a nasal turnover hinge flap was deemed most appropriate.  Using a sterile surgical marker, an appropriate hinge flap was drawn incorporating the defect. The area thus outlined was incised with a #15 scalpel blade. The flap was designed to recreate the nasal mucosal lining and the alar rim. The skin margins were undermined to an appropriate distance in all directions utilizing iris scissors.
Referred To Asc For Closure Text (Leave Blank If You Do Not Want): After obtaining clear surgical margins the patient was sent to an ASC for surgical repair.  The patient understands they will receive post-surgical care and follow-up from the ASC physician.
Show Repair Assistants Variable: Yes
Mohs Histo Method Verbiage: Each section was then chromacoded and processed in the Mohs lab using the Mohs protocol and submitted for frozen section.
Consent (Ear)/Introductory Paragraph: The rationale for Mohs was explained to the patient and consent was obtained. The risks, benefits and alternatives to therapy were discussed in detail. Specifically, the risks of ear deformity, infection, scarring, bleeding, prolonged wound healing, incomplete removal, allergy to anesthesia, nerve injury and recurrence were addressed. Prior to the procedure, the treatment site was clearly identified and confirmed by the patient. All components of Universal Protocol/PAUSE Rule completed.
Incidental Superficial Basal Cell Carcinoma Histology Text: In addition to the primary tumor, there were aggregates of basaloid cells budding off of the epidermis with atypical cell morphology.
Suture Removal: 7 days
Island Pedicle Flap Text: The defect edges were debeveled with a #15 scalpel blade.  Given the location of the defect, shape of the defect and the proximity to free margins an island pedicle advancement flap was deemed most appropriate.  Using a sterile surgical marker, an appropriate advancement flap was drawn incorporating the defect, outlining the appropriate donor tissue and placing the expected incisions within the relaxed skin tension lines where possible.    The area thus outlined was incised deep to adipose tissue with a #15 scalpel blade.  The skin margins were undermined to an appropriate distance in all directions around the primary defect and laterally outward around the island pedicle utilizing iris scissors.  There was minimal undermining beneath the pedicle flap.
Repair Type: Complex Repair
H Plasty Text: Given the location of the defect, shape of the defect and the proximity to free margins a H-plasty was deemed most appropriate for repair.  Using a sterile surgical marker, the appropriate advancement arms of the H-plasty were drawn incorporating the defect and placing the expected incisions within the relaxed skin tension lines where possible. The area thus outlined was incised deep to adipose tissue with a #15 scalpel blade. The skin margins were undermined to an appropriate distance in all directions utilizing iris scissors.  The opposing advancement arms were then advanced into place in opposite direction and anchored with interrupted buried subcutaneous sutures.
Plastic Surgeon Procedure Text (F): After obtaining clear surgical margins the patient was sent to plastics for surgical repair.  The patient understands they will receive post-surgical care and follow-up from the referring physician's office.
Oculoplastic Surgeon Procedure Text (B): After obtaining clear surgical margins the patient was sent to oculoplastics for surgical repair.  The patient understands they will receive post-surgical care and follow-up from the referring physician's office.
Dressing (No Sutures): pressure dressing with telfa
Tarsorrhaphy Text: A tarsorrhaphy was performed using Frost sutures.
Stage 9: Additional Anesthesia Type: 1% lidocaine with epinephrine
Fibroepithelioma Of Pinkus Histology Text: There were numerous interconnected and branching strands of  basaloid cells extending from the epidermis into the dermis with atypical cell morphology.
Burow's Graft Text: The defect edges were debeveled with a #15 scalpel blade.  Given the location of the defect, shape of the defect, the proximity to free margins and the presence of a standing cone deformity a Burow's skin graft was deemed most appropriate. The standing cone was removed and this tissue was then trimmed to the shape of the primary defect. The adipose tissue was also removed until only dermis and epidermis were left.  The skin margins of the secondary defect were undermined to an appropriate distance in all directions utilizing iris scissors.  The secondary defect was closed with interrupted buried subcutaneous sutures.  The skin edges were then re-apposed with running  sutures.  The skin graft was then placed in the primary defect and oriented appropriately.
Double M-Plasty Complex Repair Preamble Text (Leave Blank If You Do Not Want): Extensive wide undermining was performed.
Hemigard Postcare Instructions: The HEMIGARD strips are to remain completely dry for at least 5-7 days.
Additional Anesthesia Volume In Cc: 6
Otolaryngologist Procedure Text (F): After obtaining clear surgical margins the patient was sent to otolaryngology for surgical repair.  The patient understands they will receive post-surgical care and follow-up from the referring physician's office.
Mid-Level Procedure Text (F): After obtaining clear surgical margins the patient was sent to a mid-level provider for surgical repair.  The patient understands they will receive post-surgical care and follow-up from the mid-level provider.
Helical Rim Advancement Flap Text: The defect edges were debeveled with a #15 blade scalpel.  Given the location of the defect and the proximity to free margins (helical rim) a double helical rim advancement flap was deemed most appropriate.  Using a sterile surgical marker, the appropriate advancement flaps were drawn incorporating the defect and placing the expected incisions between the helical rim and antihelix where possible.  The area thus outlined was incised through and through with a #15 scalpel blade.  With a skin hook and iris scissors, the flaps were gently and sharply undermined and freed up.
Closure 2 Information: This tab is for additional flaps and grafts, including complex repair and grafts and complex repair and flaps. You can also specify a different location for the additional defect, if the location is the same you do not need to select a new one. We will insert the automated text for the repair you select below just as we do for solitary flaps and grafts. Please note that at this time if you select a location with a different insurance zone you will need to override the ICD10 and CPT if appropriate.
O-L Flap Text: The defect edges were debeveled with a #15 scalpel blade.  Given the location of the defect, shape of the defect and the proximity to free margins an O-L flap was deemed most appropriate.  Using a sterile surgical marker, an appropriate advancement flap was drawn incorporating the defect and placing the expected incisions within the relaxed skin tension lines where possible.    The area thus outlined was incised deep to adipose tissue with a #15 scalpel blade.  The skin margins were undermined to an appropriate distance in all directions utilizing iris scissors.
Bilobed Transposition Flap Text: The defect edges were debeveled with a #15 scalpel blade.  Given the location of the defect and the proximity to free margins a bilobed transposition flap was deemed most appropriate.  Using a sterile surgical marker, an appropriate bilobe flap drawn around the defect.    The area thus outlined was incised deep to adipose tissue with a #15 scalpel blade.  The skin margins were undermined to an appropriate distance in all directions utilizing iris scissors.
Surgeon: Izzy Mireles MD
Partial Purse String (Simple) Text: Given the location of the defect and the characteristics of the surrounding skin a simple purse string closure was deemed most appropriate.  Undermining was performed circumfirentially around the surgical defect.  A purse string suture was then placed and tightened. Wound tension only allowed a partial closure of the circular defect.
Mart-1 - Negative Histology Text: MART-1 staining demonstrates a normal density and pattern of melanocytes along the dermal-epidermal junction. The surgical margins are negative for tumor cells.
Island Pedicle Flap-Requiring Vessel Identification Text: The defect edges were debeveled with a #15 scalpel blade.  Given the location of the defect, shape of the defect and the proximity to free margins an island pedicle advancement flap was deemed most appropriate.  Using a sterile surgical marker, an appropriate advancement flap was drawn, based on the axial vessel mentioned above, incorporating the defect, outlining the appropriate donor tissue and placing the expected incisions within the relaxed skin tension lines where possible.    The area thus outlined was incised deep to adipose tissue with a #15 scalpel blade.  The skin margins were undermined to an appropriate distance in all directions around the primary defect and laterally outward around the island pedicle utilizing iris scissors.  There was minimal undermining beneath the pedicle flap.
Bcc Keratotic Histology Text: There were numerous aggregates of keratinizing basaloid cells in the dermis with atypical cell morphology.
Muscle Hinge Flap Text: The defect edges were debeveled with a #15 scalpel blade.  Given the size, depth and location of the defect and the proximity to free margins a muscle hinge flap was deemed most appropriate.  Using a sterile surgical marker, an appropriate hinge flap was drawn incorporating the defect. The area thus outlined was incised with a #15 scalpel blade.  The skin margins were undermined to an appropriate distance in all directions utilizing iris scissors.
Dermal Autograft Text: The defect edges were debeveled with a #15 scalpel blade.  Given the location of the defect, shape of the defect and the proximity to free margins a dermal autograft was deemed most appropriate.  Using a sterile surgical marker, the primary defect shape was transferred to the donor site. The area thus outlined was incised deep to adipose tissue with a #15 scalpel blade.  The harvested graft was then trimmed of adipose and epidermal tissue until only dermis was left.  The skin graft was then placed in the primary defect and oriented appropriately.
Transposition Flap Text: The defect edges were debeveled with a #15 scalpel blade.  Given the location of the defect and the proximity to free margins a transposition flap was deemed most appropriate.  Using a sterile surgical marker, an appropriate transposition flap was drawn incorporating the defect.    The area thus outlined was incised deep to adipose tissue with a #15 scalpel blade.  The skin margins were undermined to an appropriate distance in all directions utilizing iris scissors.
Same Histology In Subsequent Stages Text: The pattern and morphology of the tumor is as described in the first stage.
Graft Donor Site Dermal Sutures (Optional): 5-0 Polysorb
Mixed Nodular And Micronodular Bcc Histology Text: There were numerous nodular and micronodular aggregates of basaloid cells in the dermis with atypical cell morphology.
Postop Diagnosis: same
Graft Cartilage Fenestration Text: The cartilage was fenestrated with a 2mm punch biopsy to help facilitate graft survival and healing.
Posterior Auricular Interpolation Flap Text: A decision was made to reconstruct the defect utilizing an interpolation axial flap and a staged reconstruction.  A telfa template was made of the defect.  This telfa template was then used to outline the posterior auricular interpolation flap.  The donor area for the pedicle flap was then injected with anesthesia.  The flap was excised through the skin and subcutaneous tissue down to the layer of the underlying musculature.  The pedicle flap was carefully excised within this deep plane to maintain its blood supply.  The edges of the donor site were undermined.   The donor site was closed in a primary fashion.  The pedicle was then rotated into position and sutured.  Once the tube was sutured into place, adequate blood supply was confirmed with blanching and refill.  The pedicle was then wrapped with xeroform gauze and dressed appropriately with a telfa and gauze bandage to ensure continued blood supply and protect the attached pedicle.
Ear Star Wedge Flap Text: The defect edges were debeveled with a #15 blade scalpel.  Given the location of the defect and the proximity to free margins (helical rim) an ear star wedge flap was deemed most appropriate.  Using a sterile surgical marker, the appropriate flap was drawn incorporating the defect and placing the expected incisions between the helical rim and antihelix where possible.  The area thus outlined was incised through and through with a #15 scalpel blade.
Closure 4 Information: This tab is for additional flaps and grafts above and beyond our usual structured repairs.  Please note if you enter information here it will not currently bill and you will need to add the billing information manually.
Provider Procedure Text (F): After obtaining clear surgical margins the defect was repaired by another provider.
Tumor Depth: Less than 6mm from granular layer and no invasion beyond the subcutaneous fat
Mart-1 - Positive Histology Text: MART-1 staining demonstrates areas of higher density and clustering of melanocytes with Pagetoid spread upwards within the epidermis. The surgical margins are positive for tumor cells.
Post-Care Instructions: I reviewed with the patient in detail post-care instructions. Patient is not to engage in any heavy lifting, exercise, or swimming for the next 14 days. Should the patient develop any fevers, chills, bleeding, severe pain patient will contact the office immediately.
Unna Boot Text: An Unna boot was placed to help immobilize the limb and facilitate more rapid healing.
Rhomboid Transposition Flap Text: The defect edges were debeveled with a #15 scalpel blade.  Given the location of the defect and the proximity to free margins a rhomboid transposition flap was deemed most appropriate.  Using a sterile surgical marker, an appropriate rhomboid flap was drawn incorporating the defect.    The area thus outlined was incised deep to adipose tissue with a #15 scalpel blade.  The skin margins were undermined to an appropriate distance in all directions utilizing iris scissors.
Mixed Superficial And Nodular Bcc Histology Text: There were numerous nodular aggregates of basaloid cells in the dermis as well as budding off the epidermis with atypical cell morphology.
Hatchet Flap Text: The defect edges were debeveled with a #15 scalpel blade.  Given the location of the defect, shape of the defect and the proximity to free margins a hatchet flap was deemed most appropriate.  Using a sterile surgical marker, an appropriate hatchet flap was drawn incorporating the defect and placing the expected incisions within the relaxed skin tension lines where possible.    The area thus outlined was incised deep to adipose tissue with a #15 scalpel blade.  The skin margins were undermined to an appropriate distance in all directions utilizing iris scissors.
Scc In Situ With Follicular Extension Histology Text: There are glassy pink variably sized keratinocytes in the epidermis and extending down the follicles with full thickness atypia and atypical cell morphology.
W Plasty Text: The lesion was extirpated to the level of the fat with a #15 scalpel blade.  Given the location of the defect, shape of the defect and the proximity to free margins a W-plasty was deemed most appropriate for repair.  Using a sterile surgical marker, the appropriate transposition arms of the W-plasty were drawn incorporating the defect and placing the expected incisions within the relaxed skin tension lines where possible.    The area thus outlined was incised deep to adipose tissue with a #15 scalpel blade.  The skin margins were undermined to an appropriate distance in all directions utilizing iris scissors.  The opposing transposition arms were then transposed into place in opposite direction and anchored with interrupted buried subcutaneous sutures.
Localized Dermabrasion With Wire Brush Text: The patient was draped in routine manner.  Localized dermabrasion using 3 x 17 mm wire brush was performed in routine manner to papillary dermis. This spot dermabrasion is being performed to complete skin cancer reconstruction. It also will eliminate the other sun damaged precancerous cells that are known to be part of the regional effect of a lifetime's worth of sun exposure. This localized dermabrasion is therapeutic and should not be considered cosmetic in any regard.
Intermediate Repair Preamble Text (Leave Blank If You Do Not Want): Undermining was performed with blunt dissection.
Complex Repair And Graft Additional Text (Will Appearing After The Standard Complex Repair Text): The complex repair was not sufficient to completely close the primary defect. The remaining additional defect was repaired with the graft mentioned below.
Cheek-To-Nose Interpolation Flap Text: A decision was made to reconstruct the defect utilizing an interpolation axial flap and a staged reconstruction.  A telfa template was made of the defect.  This telfa template was then used to outline the Cheek-To-Nose Interpolation flap.  The donor area for the pedicle flap was then injected with anesthesia.  The flap was excised through the skin and subcutaneous tissue down to the layer of the underlying musculature.  The interpolation flap was carefully excised within this deep plane to maintain its blood supply.  The edges of the donor site were undermined.   The donor site was closed in a primary fashion.  The pedicle was then rotated into position and sutured.  Once the tube was sutured into place, adequate blood supply was confirmed with blanching and refill.  The pedicle was then wrapped with xeroform gauze and dressed appropriately with a telfa and gauze bandage to ensure continued blood supply and protect the attached pedicle.
Donor Site Anesthesia Type: same as repair anesthesia
Consent (Temporal Branch)/Introductory Paragraph: The rationale for Mohs was explained to the patient and consent was obtained. The risks, benefits and alternatives to therapy were discussed in detail. Specifically, the risks of damage to the temporal branch of the facial nerve, infection, scarring, bleeding, prolonged wound healing, incomplete removal, allergy to anesthesia, and recurrence were addressed. Prior to the procedure, the treatment site was clearly identified and confirmed by the patient. All components of Universal Protocol/PAUSE Rule completed.
Xenograft Text: The defect edges were debeveled with a #15 scalpel blade.  Given the location of the defect, shape of the defect and the proximity to free margins a xenograft was deemed most appropriate.  The graft was then trimmed to fit the size of the defect.  The graft was then placed in the primary defect and oriented appropriately.
Suturegard Retention Suture: 0-0 Nylon
Cheiloplasty (Less Than 50%) Text: A decision was made to reconstruct the defect with a  cheiloplasty.  The defect was undermined extensively.  Additional obicularis oris muscle was excised with a 15 blade scalpel.  The defect was converted into a full thickness wedge, of less than 50% of the vertical height of the lip, to facilite a better cosmetic result.  Small vessels were then tied off with 5-0 monocyrl. The obicularis oris, superficial fascia, adipose and dermis were then reapproximated.  After the deeper layers were approximated the epidermis was reapproximated with particular care given to realign the vermilion border.
Crescentic Advancement Flap Text: The defect edges were debeveled with a #15 scalpel blade.  Given the location of the defect and the proximity to free margins a crescentic advancement flap was deemed most appropriate.  Using a sterile surgical marker, the appropriate advancement flap was drawn incorporating the defect and placing the expected incisions within the relaxed skin tension lines where possible.    The area thus outlined was incised deep to adipose tissue with a #15 scalpel blade.  The skin margins were undermined to an appropriate distance in all directions utilizing iris scissors.
Keystone Flap Text: The defect edges were debeveled with a #15 scalpel blade.  Given the location of the defect, shape of the defect a keystone flap was deemed most appropriate.  Using a sterile surgical marker, an appropriate keystone flap was drawn incorporating the defect, outlining the appropriate donor tissue and placing the expected incisions within the relaxed skin tension lines where possible. The area thus outlined was incised deep to adipose tissue with a #15 scalpel blade.  The skin margins were undermined to an appropriate distance in all directions around the primary defect and laterally outward around the flap utilizing iris scissors.
Vermilion Border Text: The closure involved the vermilion border.
Nasalis-Muscle-Based Myocutaneous Island Pedicle Flap Text: Using a #15 blade, an incision was made around the donor flap to the level of the nasalis muscle. Wide lateral undermining was then performed in both the subcutaneous plane above the nasalis muscle, and in a submuscular plane just above periosteum. This allowed the formation of a free nasalis muscle axial pedicle (based on the angular artery) which was still attached to the actual cutaneous flap, increasing its mobility and vascular viability. Hemostasis was obtained with pinpoint electrocoagulation. The flap was mobilized into position and the pivotal anchor points positioned and stabilized with buried interrupted sutures. Subcutaneous and dermal tissues were closed in a multilayered fashion with sutures. Tissue redundancies were excised, and the epidermal edges were apposed without significant tension and sutured with sutures.
Home Suture Removal Text: Patient was provided instructions on removing sutures and will remove their sutures at home.  If they have any questions or difficulties they will call the office.
Mohs Case Number: [id number]
Date Of Previous Biopsy (Optional): 6/17/21
Surgeon/Pathologist Verbiage (Will Incorporate Name Of Surgeon From Intro If Not Blank): operated in two distinct and integrated capacities as the surgeon and pathologist.
Medical Necessity Statement: Based on my medical judgement, Mohs surgery is the most appropriate treatment for this cancer compared to other treatments.
Purse String (Simple) Text: Given the location of the defect and the characteristics of the surrounding skin a purse string closure was deemed most appropriate.  Undermining was performed circumfirentially around the surgical defect.  A purse string suture was then placed and tightened.
Number Of Hemigard Strips Per Side: 1
Cartilage Graft Text: The defect edges were debeveled with a #15 scalpel blade.  Given the location of the defect, shape of the defect, the fact the defect involved a full thickness cartilage defect a cartilage graft was deemed most appropriate.  An appropriate donor site was identified, cleansed, and anesthetized. The cartilage graft was then harvested and transferred to the recipient site, oriented appropriately and then sutured into place.  The secondary defect was then repaired using a primary closure.
Mixed Nodular And Infiltrative Bcc Histology Text: There were numerous nodular and infiltrative aggregates of basaloid cells in the dermis with atypical cell morphology.
Suturegard Intro: Intraoperative tissue expansion was performed, utilizing the SUTUREGARD device, in order to reduce wound tension.
Consent (Scalp)/Introductory Paragraph: The rationale for Mohs was explained to the patient and consent was obtained. The risks, benefits and alternatives to therapy were discussed in detail. Specifically, the risks of changes in hair growth pattern secondary to repair, infection, scarring, bleeding, prolonged wound healing, incomplete removal, allergy to anesthesia, nerve injury and recurrence were addressed. Prior to the procedure, the treatment site was clearly identified and confirmed by the patient. All components of Universal Protocol/PAUSE Rule completed.
Cheek Interpolation Flap Text: A decision was made to reconstruct the defect utilizing an interpolation axial flap and a staged reconstruction.  A telfa template was made of the defect.  This telfa template was then used to outline the Cheek Interpolation flap.  The donor area for the pedicle flap was then injected with anesthesia.  The flap was excised through the skin and subcutaneous tissue down to the layer of the underlying musculature.  The interpolation flap was carefully excised within this deep plane to maintain its blood supply.  The edges of the donor site were undermined.   The donor site was closed in a primary fashion.  The pedicle was then rotated into position and sutured.  Once the tube was sutured into place, adequate blood supply was confirmed with blanching and refill.  The pedicle was then wrapped with xeroform gauze and dressed appropriately with a telfa and gauze bandage to ensure continued blood supply and protect the attached pedicle.
Information: Selecting Yes will display possible errors in your note based on the variables you have selected. This validation is only offered as a suggestion for you. PLEASE NOTE THAT THE VALIDATION TEXT WILL BE REMOVED WHEN YOU FINALIZE YOUR NOTE. IF YOU WANT TO FAX A PRELIMINARY NOTE YOU WILL NEED TO TOGGLE THIS TO 'NO' IF YOU DO NOT WANT IT IN YOUR FAXED NOTE.
Skin Substitute Text: The defect edges were debeveled with a #15 scalpel blade.  Given the location of the defect, shape of the defect and the proximity to free margins a skin substitute graft was deemed most appropriate.  The graft material was trimmed to fit the size of the defect. The graft was then placed in the primary defect and oriented appropriately.
Area L Indication Text: Tumors in this location are included in Area L (trunk and extremities).  Mohs surgery is indicated for larger tumors, or tumors with aggressive histologic features, in these anatomic locations.
Secondary Intention Text (Leave Blank If You Do Not Want): The defect will heal with secondary intention.
Banner Transposition Flap Text: The defect edges were debeveled with a #15 scalpel blade.  Given the location of the defect and the proximity to free margins a Banner transposition flap was deemed most appropriate.  Using a sterile surgical marker, an appropriate flap drawn around the defect. The area thus outlined was incised deep to adipose tissue with a #15 scalpel blade.  The skin margins were undermined to an appropriate distance in all directions utilizing iris scissors.
Scc Poorly Differentiated Histology Text: There are nests and single cells of poorly-differentiated atypical squamous epithelial cells extending into the dermis with atypical cell morphology.
Scc In Situ Histology Text: There are glassy pink variably sized keratinocytes in the epidermis with full thickness atypia and atypical cell morphology.
Alternatives Discussed Intro (Do Not Add Period): I discussed alternative treatments to Mohs surgery and specifically discussed the risks and benefits of
Consent (Spinal Accessory)/Introductory Paragraph: The rationale for Mohs was explained to the patient and consent was obtained. The risks, benefits and alternatives to therapy were discussed in detail. Specifically, the risks of damage to the spinal accessory nerve, infection, scarring, bleeding, prolonged wound healing, incomplete removal, allergy to anesthesia, and recurrence were addressed. Prior to the procedure, the treatment site was clearly identified and confirmed by the patient. All components of Universal Protocol/PAUSE Rule completed.
Pain Refusal Text: I offered to prescribe pain medication but the patient refused to take this medication.
Epidermal Autograft Text: The defect edges were debeveled with a #15 scalpel blade.  Given the location of the defect, shape of the defect and the proximity to free margins an epidermal autograft was deemed most appropriate.  Using a sterile surgical marker, the primary defect shape was transferred to the donor site. The epidermal graft was then harvested.  The skin graft was then placed in the primary defect and oriented appropriately.
Melolabial Transposition Flap Text: The defect edges were debeveled with a #15 scalpel blade.  Given the location of the defect and the proximity to free margins a melolabial flap was deemed most appropriate.  Using a sterile surgical marker, an appropriate melolabial transposition flap was drawn incorporating the defect.    The area thus outlined was incised deep to adipose tissue with a #15 scalpel blade.  The skin margins were undermined to an appropriate distance in all directions utilizing iris scissors.
Spiral Flap Text: The defect edges were debeveled with a #15 scalpel blade.  Given the location of the defect, shape of the defect and the proximity to free margins a spiral flap was deemed most appropriate.  Using a sterile surgical marker, an appropriate rotation flap was drawn incorporating the defect and placing the expected incisions within the relaxed skin tension lines where possible. The area thus outlined was incised deep to adipose tissue with a #15 scalpel blade.  The skin margins were undermined to an appropriate distance in all directions utilizing iris scissors.
Mohs Method Verbiage: An incision at a 45 degree angle following the standard Mohs approach was done and the specimen was harvested as a microscopic controlled layer.
Advancement Flap (Single) Text: The defect edges were debeveled with a #15 scalpel blade.  Given the location of the defect and the proximity to free margins a single advancement flap was deemed most appropriate.  Using a sterile surgical marker, an appropriate advancement flap was drawn incorporating the defect and placing the expected incisions within the relaxed skin tension lines where possible.    The area thus outlined was incised deep to adipose tissue with a #15 scalpel blade.  The skin margins were undermined to an appropriate distance in all directions utilizing iris scissors.
O-Z Flap Text: The defect edges were debeveled with a #15 scalpel blade.  Given the location of the defect, shape of the defect and the proximity to free margins an O-Z flap was deemed most appropriate.  Using a sterile surgical marker, an appropriate transposition flap was drawn incorporating the defect and placing the expected incisions within the relaxed skin tension lines where possible. The area thus outlined was incised deep to adipose tissue with a #15 scalpel blade.  The skin margins were undermined to an appropriate distance in all directions utilizing iris scissors.
Partial Purse String (Intermediate) Text: Given the location of the defect and the characteristics of the surrounding skin an intermediate purse string closure was deemed most appropriate.  Undermining was performed circumfirentially around the surgical defect.  A purse string suture was then placed and tightened. Wound tension only allowed a partial closure of the circular defect.
Incidental Actinic Keratosis Histology Text: In addition to the primary tumor, there were atypical keratinocytes in the epidermis with atypical cell morphology that did not extend the full thickness of the epidermis.
Tumor Debulked?: curette
Dorsal Nasal Flap Text: The defect edges were debeveled with a #15 scalpel blade.  Given the location of the defect and the proximity to free margins a dorsal nasal flap was deemed most appropriate.  Using a sterile surgical marker, an appropriate dorsal nasal flap was drawn around the defect.    The area thus outlined was incised deep to adipose tissue with a #15 scalpel blade.  The skin margins were undermined to an appropriate distance in all directions utilizing iris scissors.
Advancement Flap (Double) Text: The defect edges were debeveled with a #15 scalpel blade.  Given the location of the defect and the proximity to free margins a double advancement flap was deemed most appropriate.  Using a sterile surgical marker, the appropriate advancement flaps were drawn incorporating the defect and placing the expected incisions within the relaxed skin tension lines where possible.    The area thus outlined was incised deep to adipose tissue with a #15 scalpel blade.  The skin margins were undermined to an appropriate distance in all directions utilizing iris scissors.
Detail Level: Detailed
Bcc Histology Text: There were numerous aggregates of basaloid cells in the dermis with atypical cell morphology.
Undermining Location (Optional): in the superficial subcutaneous fat
Anesthesia Volume In Cc: 9
Incidental Squamous Cell Carcinoma In Situ Histology Text: In addition to the primary tumor, there were glassy pink keratinocytes in the epidermis with full thickness atypia and atypical cell morphology.
Is There Documentation In The Chart Showing Discussion Of Changes With Another Physician?: Please Select the Appropriate Response
Consent Type: Consent 1 (Standard)
Graft Donor Site Epidermal Sutures (Optional): 5-0 Surgipro
Repair Anesthesia Method: local infiltration
Composite Graft Text: The defect edges were debeveled with a #15 scalpel blade.  Given the location of the defect, shape of the defect, the proximity to free margins and the fact the defect was full thickness a composite graft was deemed most appropriate.  The defect was outline and then transferred to the donor site.  A full thickness graft was then excised from the donor site. The graft was then placed in the primary defect, oriented appropriately and then sutured into place.  The secondary defect was then repaired using a primary closure.
Inflammation Suggestive Of Cancer Camouflage Histology Text: There was a dense lymphocytic infiltrate which prevented adequate histologic evaluation of adjacent structures.
Consent 2/Introductory Paragraph: Mohs surgery was explained to the patient and consent was obtained. The risks, benefits and alternatives to therapy were discussed in detail. Specifically, the risks of infection, scarring, bleeding, prolonged wound healing, incomplete removal, allergy to anesthesia, nerve injury and recurrence were addressed. Prior to the procedure, the treatment site was clearly identified and confirmed by the patient. All components of Universal Protocol/PAUSE Rule completed.
Paramedian Forehead Flap Text: A decision was made to reconstruct the defect utilizing an interpolation axial flap and a staged reconstruction.  A telfa template was made of the defect.  This telfa template was then used to outline the paramedian forehead pedicle flap.  The donor area for the pedicle flap was then injected with anesthesia.  The flap was excised through the skin and subcutaneous tissue down to the layer of the underlying musculature.  The pedicle flap was carefully excised within this deep plane to maintain its blood supply.  The edges of the donor site were undermined.   The donor site was closed in a primary fashion.  The pedicle was then rotated into position and sutured.  Once the tube was sutured into place, adequate blood supply was confirmed with blanching and refill.  The pedicle was then wrapped with xeroform gauze and dressed appropriately with a telfa and gauze bandage to ensure continued blood supply and protect the attached pedicle.
Consent (Near Eyelid Margin)/Introductory Paragraph: The rationale for Mohs was explained to the patient and consent was obtained. The risks, benefits and alternatives to therapy were discussed in detail. Specifically, the risks of ectropion or eyelid deformity, infection, scarring, bleeding, prolonged wound healing, incomplete removal, allergy to anesthesia, nerve injury and recurrence were addressed. Prior to the procedure, the treatment site was clearly identified and confirmed by the patient. All components of Universal Protocol/PAUSE Rule completed.
Rhombic Flap Text: The defect edges were debeveled with a #15 scalpel blade.  Given the location of the defect and the proximity to free margins a rhombic flap was deemed most appropriate.  Using a sterile surgical marker, an appropriate rhombic flap was drawn incorporating the defect.    The area thus outlined was incised deep to adipose tissue with a #15 scalpel blade.  The skin margins were undermined to an appropriate distance in all directions utilizing iris scissors.
Wound Care: Vaseline
Body Location Override (Optional - Billing Will Still Be Based On Selected Body Map Location If Applicable): Left mid temple
Brow Lift Text: A midfrontal incision was made medially to the defect to allow access to the tissues just superior to the left eyebrow. Following careful dissection inferiorly in a supraperiosteal plane to the level of the left eyebrow, several 3-0 monocryl sutures were used to resuspend the eyebrow orbicularis oculi muscular unit to the superior frontal bone periosteum. This resulted in an appropriate reapproximation of static eyebrow symmetry and correction of the left brow ptosis.
Modified Advancement Flap Text: The defect edges were debeveled with a #15 scalpel blade.  Given the location of the defect, shape of the defect and the proximity to free margins a modified advancement flap was deemed most appropriate.  Using a sterile surgical marker, an appropriate advancement flap was drawn incorporating the defect and placing the expected incisions within the relaxed skin tension lines where possible.    The area thus outlined was incised deep to adipose tissue with a #15 scalpel blade.  The skin margins were undermined to an appropriate distance in all directions utilizing iris scissors.
Mastoid Interpolation Flap Text: A decision was made to reconstruct the defect utilizing an interpolation axial flap and a staged reconstruction.  A telfa template was made of the defect.  This telfa template was then used to outline the mastoid interpolation flap.  The donor area for the pedicle flap was then injected with anesthesia.  The flap was excised through the skin and subcutaneous tissue down to the layer of the underlying musculature.  The pedicle flap was carefully excised within this deep plane to maintain its blood supply.  The edges of the donor site were undermined.   The donor site was closed in a primary fashion.  The pedicle was then rotated into position and sutured.  Once the tube was sutured into place, adequate blood supply was confirmed with blanching and refill.  The pedicle was then wrapped with xeroform gauze and dressed appropriately with a telfa and gauze bandage to ensure continued blood supply and protect the attached pedicle.
Complex Repair And Flap Additional Text (Will Appearing After The Standard Complex Repair Text): The complex repair was not sufficient to completely close the primary defect. The remaining additional defect was repaired with the flap mentioned below.
Z Plasty Text: The lesion was extirpated to the level of the fat with a #15 scalpel blade.  Given the location of the defect, shape of the defect and the proximity to free margins a Z-plasty was deemed most appropriate for repair.  Using a sterile surgical marker, the appropriate transposition arms of the Z-plasty were drawn incorporating the defect and placing the expected incisions within the relaxed skin tension lines where possible.    The area thus outlined was incised deep to adipose tissue with a #15 scalpel blade.  The skin margins were undermined to an appropriate distance in all directions utilizing iris scissors.  The opposing transposition arms were then transposed into place in opposite direction and anchored with interrupted buried subcutaneous sutures.
Mucosal Advancement Flap Text: Given the location of the defect, shape of the defect and the proximity to free margins a mucosal advancement flap was deemed most appropriate. Incisions were made with a 15 blade scalpel in the appropriate fashion along the cutaneous vermilion border and the mucosal lip. The remaining actinically damaged mucosal tissue was excised.  The mucosal advancement flap was then elevated to the gingival sulcus with care taken to preserve the neurovascular structures and advanced into the primary defect. Care was taken to ensure that precise realignment of the vermilion border was achieved.
Manual Repair Warning Statement: We plan on removing the manually selected variable below in favor of our much easier automatic structured text blocks found in the previous tab. We decided to do this to help make the flow better and give you the full power of structured data. Manual selection is never going to be ideal in our platform and I would encourage you to avoid using manual selection from this point on, especially since I will be sunsetting this feature. It is important that you do one of two things with the customized text below. First, you can save all of the text in a word file so you can have it for future reference. Second, transfer the text to the appropriate area in the Library tab. Lastly, if there is a flap or graft type which we do not have you need to let us know right away so I can add it in before the variable is hidden. No need to panic, we plan to give you roughly 6 months to make the change.
Island Pedicle Flap With Canthal Suspension Text: The defect edges were debeveled with a #15 scalpel blade.  Given the location of the defect, shape of the defect and the proximity to free margins an island pedicle advancement flap was deemed most appropriate.  Using a sterile surgical marker, an appropriate advancement flap was drawn incorporating the defect, outlining the appropriate donor tissue and placing the expected incisions within the relaxed skin tension lines where possible. The area thus outlined was incised deep to adipose tissue with a #15 scalpel blade.  The skin margins were undermined to an appropriate distance in all directions around the primary defect and laterally outward around the island pedicle utilizing iris scissors.  There was minimal undermining beneath the pedicle flap. A suspension suture was placed in the canthal tendon to prevent tension and prevent ectropion.
Previous Accession (Optional): [id number].
Graft Donor Site Bandage (Optional-Leave Blank If You Don't Want In Note): A bolster was fitted to the graft site and sewn into place. A pressure bandage were applied to the donor site and over the bolster.
Consent (Nose)/Introductory Paragraph: The rationale for Mohs was explained to the patient and consent was obtained. The risks, benefits and alternatives to therapy were discussed in detail. Specifically, the risks of nasal deformity, changes in the flow of air through the nose, infection, scarring, bleeding, prolonged wound healing, incomplete removal, allergy to anesthesia, nerve injury and recurrence were addressed. Prior to the procedure, the treatment site was clearly identified and confirmed by the patient. All components of Universal Protocol/PAUSE Rule completed.
Interpolation Flap Text: A decision was made to reconstruct the defect utilizing an interpolation axial flap and a staged reconstruction.  A telfa template was made of the defect.  This telfa template was then used to outline the interpolation flap.  The donor area for the pedicle flap was then injected with anesthesia.  The flap was excised through the skin and subcutaneous tissue down to the layer of the underlying musculature.  The interpolation flap was carefully excised within this deep plane to maintain its blood supply.  The edges of the donor site were undermined.   The donor site was closed in a primary fashion.  The pedicle was then rotated into position and sutured.  Once the tube was sutured into place, adequate blood supply was confirmed with blanching and refill.  The pedicle was then wrapped with xeroform gauze and dressed appropriately with a telfa and gauze bandage to ensure continued blood supply and protect the attached pedicle.
Subsequent Stages Histo Method Verbiage: Using a similar technique to that described above, a thin layer of tissue was removed from all areas where tumor was visible on the previous stage.  The tissue was again oriented, mapped, dyed, and processed as above.
Ftsg Text: The defect edges were debeveled with a #15 scalpel blade.  Given the location of the defect, shape of the defect and the proximity to free margins a full thickness skin graft was deemed most appropriate.  Using a sterile surgical marker, the primary defect shape was transferred to the donor site. The area thus outlined was incised deep to adipose tissue with a #15 scalpel blade.  The harvested graft was then trimmed of adipose tissue until only dermis and epidermis was left.  The skin margins of the secondary defect were undermined to an appropriate distance in all directions utilizing iris scissors.  The secondary defect was closed with interrupted buried subcutaneous sutures.  The skin edges were then re-apposed with running  sutures.  The skin graft was then placed in the primary defect and oriented appropriately.
Purse String (Intermediate) Text: Given the location of the defect and the characteristics of the surrounding skin a purse string intermediate closure was deemed most appropriate.  Undermining was performed circumfirentially around the surgical defect.  A purse string suture was then placed and tightened.
Mercedes Flap Text: The defect edges were debeveled with a #15 scalpel blade.  Given the location of the defect, shape of the defect and the proximity to free margins a Mercedes flap was deemed most appropriate.  Using a sterile surgical marker, an appropriate advancement flap was drawn incorporating the defect and placing the expected incisions within the relaxed skin tension lines where possible. The area thus outlined was incised deep to adipose tissue with a #15 scalpel blade.  The skin margins were undermined to an appropriate distance in all directions utilizing iris scissors.
Undermining Type: Entire Wound
Alar Island Pedicle Flap Text: The defect edges were debeveled with a #15 scalpel blade.  Given the location of the defect, shape of the defect and the proximity to the alar rim an island pedicle advancement flap was deemed most appropriate.  Using a sterile surgical marker, an appropriate advancement flap was drawn incorporating the defect, outlining the appropriate donor tissue and placing the expected incisions within the nasal ala running parallel to the alar rim. The area thus outlined was incised with a #15 scalpel blade.  The skin margins were undermined minimally to an appropriate distance in all directions around the primary defect and laterally outward around the island pedicle utilizing iris scissors.  There was minimal undermining beneath the pedicle flap.
Hemigard Intro: Due to skin fragility and wound tension, it was decided to use HEMIGARD adhesive retention suture devices to permit a linear closure. The skin was cleaned and dried for a 6cm distance away from the wound. Excessive hair, if present, was removed to allow for adhesion.
Eye Protection Verbiage: Before proceeding with the stage, a plastic scleral shield was inserted. The globe was anesthetized with a few drops of 1% lidocaine with 1:100,000 epinephrine. Then, an appropriate sized scleral shield was chosen and coated with lacrilube ointment. The shield was gently inserted and left in place for the duration of each stage. After the stage was completed, the shield was gently removed.
V-Y Plasty Text: The defect edges were debeveled with a #15 scalpel blade.  Given the location of the defect, shape of the defect and the proximity to free margins an V-Y advancement flap was deemed most appropriate.  Using a sterile surgical marker, an appropriate advancement flap was drawn incorporating the defect and placing the expected incisions within the relaxed skin tension lines where possible.    The area thus outlined was incised deep to adipose tissue with a #15 scalpel blade.  The skin margins were undermined to an appropriate distance in all directions utilizing iris scissors.
Bilateral Helical Rim Advancement Flap Text: The defect edges were debeveled with a #15 blade scalpel.  Given the location of the defect and the proximity to free margins (helical rim) a bilateral helical rim advancement flap was deemed most appropriate.  Using a sterile surgical marker, the appropriate advancement flaps were drawn incorporating the defect and placing the expected incisions between the helical rim and antihelix where possible.  The area thus outlined was incised through and through with a #15 scalpel blade.  With a skin hook and iris scissors, the flaps were gently and sharply undermined and freed up.
Consent (Marginal Mandibular)/Introductory Paragraph: The rationale for Mohs was explained to the patient and consent was obtained. The risks, benefits and alternatives to therapy were discussed in detail. Specifically, the risks of damage to the marginal mandibular branch of the facial nerve, infection, scarring, bleeding, prolonged wound healing, incomplete removal, allergy to anesthesia, and recurrence were addressed. Prior to the procedure, the treatment site was clearly identified and confirmed by the patient. All components of Universal Protocol/PAUSE Rule completed.
Area M Indication Text: Tumors in this location are included in Area M (cheek, forehead, scalp, neck, jawline and pretibial skin).  Mohs surgery is indicated for tumors in these anatomic locations.
Debridement Text: The wound edges were debrided prior to proceeding with the closure to facilitate wound healing.
Epidermal Closure Graft Donor Site (Optional): running
A-T Advancement Flap Text: The defect edges were debeveled with a #15 scalpel blade.  Given the location of the defect, shape of the defect and the proximity to free margins an A-T advancement flap was deemed most appropriate.  Using a sterile surgical marker, an appropriate advancement flap was drawn incorporating the defect and placing the expected incisions within the relaxed skin tension lines where possible.    The area thus outlined was incised deep to adipose tissue with a #15 scalpel blade.  The skin margins were undermined to an appropriate distance in all directions utilizing iris scissors.
Retention Suture Bite Size: 1 mm
Full Thickness Lip Wedge Repair (Flap) Text: Given the location of the defect and the proximity to free margins a full thickness wedge repair was deemed most appropriate.  Using a sterile surgical marker, the appropriate repair was drawn incorporating the defect and placing the expected incisions perpendicular to the vermilion border.  The vermilion border was also meticulously outlined to ensure appropriate reapproximation during the repair.  The area thus outlined was incised through and through with a #15 scalpel blade.  The muscularis and dermis were reaproximated with deep sutures following hemostasis. Care was taken to realign the vermilion border before proceeding with the superficial closure.  Once the vermilion was realigned the superfical and mucosal closure was finished.
Consent 3/Introductory Paragraph: I gave the patient a chance to ask questions they had about the procedure.  Following this I explained the Mohs procedure and consent was obtained. The risks, benefits and alternatives to therapy were discussed in detail. Specifically, the risks of infection, scarring, bleeding, prolonged wound healing, incomplete removal, allergy to anesthesia, nerve injury and recurrence were addressed. Prior to the procedure, the treatment site was clearly identified and confirmed by the patient. All components of Universal Protocol/PAUSE Rule completed.
O-T Plasty Text: The defect edges were debeveled with a #15 scalpel blade.  Given the location of the defect, shape of the defect and the proximity to free margins an O-T plasty was deemed most appropriate.  Using a sterile surgical marker, an appropriate O-T plasty was drawn incorporating the defect and placing the expected incisions within the relaxed skin tension lines where possible.    The area thus outlined was incised deep to adipose tissue with a #15 scalpel blade.  The skin margins were undermined to an appropriate distance in all directions utilizing iris scissors.
Staging Info: By selecting yes to the question above you will include information on AJCC 8 tumor staging in your Mohs note. Information on tumor staging will be automatically added for SCCs on the head and neck. AJCC 8 includes tumor size, tumor depth, perineural involvement and bone invasion.
O-Z Plasty Text: The defect edges were debeveled with a #15 scalpel blade.  Given the location of the defect, shape of the defect and the proximity to free margins an O-Z plasty (double transposition flap) was deemed most appropriate.  Using a sterile surgical marker, the appropriate transposition flaps were drawn incorporating the defect and placing the expected incisions within the relaxed skin tension lines where possible.    The area thus outlined was incised deep to adipose tissue with a #15 scalpel blade.  The skin margins were undermined to an appropriate distance in all directions utilizing iris scissors.  Hemostasis was achieved with electrocautery.  The flaps were then transposed into place, one clockwise and the other counterclockwise, and anchored with interrupted buried subcutaneous sutures.
Repair Hemostasis (Optional): Pinpoint electrocautery
Ear Wedge Repair Text: A wedge excision was completed by carrying down an excision through the full thickness of the ear and cartilage with an inward facing Burow's triangle. The wound was then closed in a layered fashion.
Star Wedge Flap Text: The defect edges were debeveled with a #15 scalpel blade.  Given the location of the defect, shape of the defect and the proximity to free margins a star wedge flap was deemed most appropriate.  Using a sterile surgical marker, an appropriate rotation flap was drawn incorporating the defect and placing the expected incisions within the relaxed skin tension lines where possible. The area thus outlined was incised deep to adipose tissue with a #15 scalpel blade.  The skin margins were undermined to an appropriate distance in all directions utilizing iris scissors.
Melolabial Interpolation Flap Text: A decision was made to reconstruct the defect utilizing an interpolation axial flap and a staged reconstruction.  A telfa template was made of the defect.  This telfa template was then used to outline the melolabial interpolation flap.  The donor area for the pedicle flap was then injected with anesthesia.  The flap was excised through the skin and subcutaneous tissue down to the layer of the underlying musculature.  The pedicle flap was carefully excised within this deep plane to maintain its blood supply.  The edges of the donor site were undermined.   The donor site was closed in a primary fashion.  The pedicle was then rotated into position and sutured.  Once the tube was sutured into place, adequate blood supply was confirmed with blanching and refill.  The pedicle was then wrapped with xeroform gauze and dressed appropriately with a telfa and gauze bandage to ensure continued blood supply and protect the attached pedicle.
Double O-Z Plasty Text: The defect edges were debeveled with a #15 scalpel blade.  Given the location of the defect, shape of the defect and the proximity to free margins a Double O-Z plasty (double transposition flap) was deemed most appropriate.  Using a sterile surgical marker, the appropriate transposition flaps were drawn incorporating the defect and placing the expected incisions within the relaxed skin tension lines where possible. The area thus outlined was incised deep to adipose tissue with a #15 scalpel blade.  The skin margins were undermined to an appropriate distance in all directions utilizing iris scissors.  Hemostasis was achieved with electrocautery.  The flaps were then transposed into place, one clockwise and the other counterclockwise, and anchored with interrupted buried subcutaneous sutures.
Mauc Instructions: By selecting yes to the question below the MAUC number will be added into the note.  This will be calculated automatically based on the diagnosis chosen, the size entered, the body zone selected (H,M,L) and the specific indications you chose. You will also have the option to override the Mohs AUC if you disagree with the automatically calculated number and this option is found in the Case Summary tab.
Bcc Superficial Histology Text: There were numerous aggregates of basaloid cells budding off the epidermis with atypical cell morphology.
Bcc Infiltrative Histology Text: There were numerous aggregates of basaloid cells demonstrating an infiltrative pattern in the dermis with atypical cell morphology
Cheiloplasty (Complex) Text: A decision was made to reconstruct the defect with a  cheiloplasty.  The defect was undermined extensively.  Additional obicularis oris muscle was excised with a 15 blade scalpel.  The defect was converted into a full thickness wedge to facilite a better cosmetic result.  Small vessels were then tied off with 5-0 monocyrl. The obicularis oris, superficial fascia, adipose and dermis were then reapproximated.  After the deeper layers were approximated the epidermis was reapproximated with particular care given to realign the vermilion border.
Nostril Rim Text: The closure involved the nostril rim.
No Repair - Repaired With Adjacent Surgical Defect Text (Leave Blank If You Do Not Want): After obtaining clear surgical margins the defect was repaired concurrently with another surgical defect which was in close approximation.
Split-Thickness Skin Graft Text: The defect edges were debeveled with a #15 scalpel blade.  Given the location of the defect, shape of the defect and the proximity to free margins a split thickness skin graft was deemed most appropriate.  Using a sterile surgical marker, the primary defect shape was transferred to the donor site. The split thickness graft was then harvested.  The skin graft was then placed in the primary defect and oriented appropriately.
Bi-Rhombic Flap Text: The defect edges were debeveled with a #15 scalpel blade.  Given the location of the defect and the proximity to free margins a bi-rhombic flap was deemed most appropriate.  Using a sterile surgical marker, an appropriate rhombic flap was drawn incorporating the defect. The area thus outlined was incised deep to adipose tissue with a #15 scalpel blade.  The skin margins were undermined to an appropriate distance in all directions utilizing iris scissors.
No Residual Tumor Seen Histology Text: There were no malignant cells seen in the sections examined.
Double Island Pedicle Flap Text: The defect edges were debeveled with a #15 scalpel blade.  Given the location of the defect, shape of the defect and the proximity to free margins a double island pedicle advancement flap was deemed most appropriate.  Using a sterile surgical marker, an appropriate advancement flap was drawn incorporating the defect, outlining the appropriate donor tissue and placing the expected incisions within the relaxed skin tension lines where possible.    The area thus outlined was incised deep to adipose tissue with a #15 scalpel blade.  The skin margins were undermined to an appropriate distance in all directions around the primary defect and laterally outward around the island pedicle utilizing iris scissors.  There was minimal undermining beneath the pedicle flap.
Suturegard Body: The suture ends were repeatedly re-tightened and re-clamped to achieve the desired tissue expansion.
Consent 1/Introductory Paragraph: The rationale for Mohs was explained to the patient and consent was obtained. The risks, benefits and alternatives to therapy were discussed in detail. Specifically, the risks of infection, scarring, bleeding, prolonged wound healing, incomplete removal, allergy to anesthesia, nerve injury and recurrence were addressed. Prior to the procedure, the treatment site was clearly identified and confirmed by the patient. All components of Universal Protocol/PAUSE Rule completed.
Scc Moderately Differentiated Histology Text: There are nests and single cells of moderately-differentiated atypical squamous epithelial cells extending into the dermis with atypical cell morphology.
Hemostasis: Electrocautery
Area H Indication Text: Tumors in this location are included in Area H (eyelids, eyebrows, nose, lips, chin, ear, pre-auricular, post-auricular, temple, genitalia, hands, feet, ankles and areola).  Tissue conservation is critical in these anatomic locations.
Scc Ka Subtype Histology Text: There are nests of atypical squamous epithelial cells arising from the epidermis and extending into the dermis.
Non-Graft Cartilage Fenestration Text: The cartilage was fenestrated with a 2mm punch biopsy to help facilitate healing.
Helical Rim Text: The closure involved the helical rim.
Orbicularis Oris Muscle Flap Text: The defect edges were debeveled with a #15 scalpel blade.  Given that the defect affected the competency of the oral sphincter an obicularis oris muscle flap was deemed most appropriate to restore this competency and normal muscle function.  Using a sterile surgical marker, an appropriate flap was drawn incorporating the defect. The area thus outlined was incised with a #15 scalpel blade.
Scc Well Differentiated Histology Text: There are nests of well-differentiated atypical squamous epithelial cells arising from the epidermis and extending into the dermis with keratin pearls and atypical cell morphology.
Trilobed Flap Text: The defect edges were debeveled with a #15 scalpel blade.  Given the location of the defect and the proximity to free margins a trilobed flap was deemed most appropriate.  Using a sterile surgical marker, an appropriate trilobed flap drawn around the defect.    The area thus outlined was incised deep to adipose tissue with a #15 scalpel blade.  The skin margins were undermined to an appropriate distance in all directions utilizing iris scissors.
Burow's Advancement Flap Text: The defect edges were debeveled with a #15 scalpel blade.  Given the location of the defect and the proximity to free margins a Burow's advancement flap was deemed most appropriate.  Using a sterile surgical marker, the appropriate advancement flap was drawn incorporating the defect and placing the expected incisions within the relaxed skin tension lines where possible.    The area thus outlined was incised deep to adipose tissue with a #15 scalpel blade.  The skin margins were undermined to an appropriate distance in all directions utilizing iris scissors.
Advancement-Rotation Flap Text: The defect edges were debeveled with a #15 scalpel blade.  Given the location of the defect, shape of the defect and the proximity to free margins an advancement-rotation flap was deemed most appropriate.  Using a sterile surgical marker, an appropriate flap was drawn incorporating the defect and placing the expected incisions within the relaxed skin tension lines where possible. The area thus outlined was incised deep to adipose tissue with a #15 scalpel blade.  The skin margins were undermined to an appropriate distance in all directions utilizing iris scissors.
Peng Advancement Flap Text: The defect edges were debeveled with a #15 scalpel blade.  Given the location of the defect, shape of the defect and the proximity to free margins a Peng advancement flap was deemed most appropriate.  Using a sterile surgical marker, an appropriate advancement flap was drawn incorporating the defect and placing the expected incisions within the relaxed skin tension lines where possible. The area thus outlined was incised deep to adipose tissue with a #15 scalpel blade.  The skin margins were undermined to an appropriate distance in all directions utilizing iris scissors.
Tissue Cultured Epidermal Autograft Text: The defect edges were debeveled with a #15 scalpel blade.  Given the location of the defect, shape of the defect and the proximity to free margins a tissue cultured epidermal autograft was deemed most appropriate.  The graft was then trimmed to fit the size of the defect.  The graft was then placed in the primary defect and oriented appropriately.
Where Do You Want The Question To Include Opioid Counseling Located?: Case Summary Tab
Rotation Flap Text: The defect edges were debeveled with a #15 scalpel blade.  Given the location of the defect, shape of the defect and the proximity to free margins a rotation flap was deemed most appropriate.  Using a sterile surgical marker, an appropriate rotation flap was drawn incorporating the defect and placing the expected incisions within the relaxed skin tension lines where possible.    The area thus outlined was incised deep to adipose tissue with a #15 scalpel blade.  The skin margins were undermined to an appropriate distance in all directions utilizing iris scissors.
Epidermal Sutures: 6-0 Surgipro
Wound Care (No Sutures): Petrolatum
V-Y Flap Text: The defect edges were debeveled with a #15 scalpel blade.  Given the location of the defect, shape of the defect and the proximity to free margins a V-Y flap was deemed most appropriate.  Using a sterile surgical marker, an appropriate advancement flap was drawn incorporating the defect and placing the expected incisions within the relaxed skin tension lines where possible.    The area thus outlined was incised deep to adipose tissue with a #15 scalpel blade.  The skin margins were undermined to an appropriate distance in all directions utilizing iris scissors.
Zygomaticofacial Flap Text: Given the location of the defect, shape of the defect and the proximity to free margins a zygomaticofacial flap was deemed most appropriate for repair.  Using a sterile surgical marker, the appropriate flap was drawn incorporating the defect and placing the expected incisions within the relaxed skin tension lines where possible. The area thus outlined was incised deep to adipose tissue with a #15 scalpel blade with preservation of a vascular pedicle.  The skin margins were undermined to an appropriate distance in all directions utilizing iris scissors.  The flap was then placed into the defect and anchored with interrupted buried subcutaneous sutures.
Deep Sutures: 5-0 Maxon
Referring Physician (Optional): Alex Galicia MD
Double O-Z Flap Text: The defect edges were debeveled with a #15 scalpel blade.  Given the location of the defect, shape of the defect and the proximity to free margins a Double O-Z flap was deemed most appropriate.  Using a sterile surgical marker, an appropriate transposition flap was drawn incorporating the defect and placing the expected incisions within the relaxed skin tension lines where possible. The area thus outlined was incised deep to adipose tissue with a #15 scalpel blade.  The skin margins were undermined to an appropriate distance in all directions utilizing iris scissors.
Missing Epidermis Histology Text: There was focal missing epidermis on the sections examined.
Retention Suture Text: Retention sutures were placed to support the closure and prevent dehiscence.
Bcc Micronodular Histology Text: There were numerous micronodular aggregates of basaloid cells in the dermis with atypical cell morphology.
S Plasty Text: Given the location and shape of the defect, and the orientation of relaxed skin tension lines, an S-plasty was deemed most appropriate for repair.  Using a sterile surgical marker, the appropriate outline of the S-plasty was drawn, incorporating the defect and placing the expected incisions within the relaxed skin tension lines where possible.  The area thus outlined was incised deep to adipose tissue with a #15 scalpel blade.  The skin margins were undermined to an appropriate distance in all directions utilizing iris scissors. The skin flaps were advanced over the defect.  The opposing margins were then approximated with interrupted buried subcutaneous sutures.
Consent (Lip)/Introductory Paragraph: The rationale for Mohs was explained to the patient and consent was obtained. The risks, benefits and alternatives to therapy were discussed in detail. Specifically, the risks of lip deformity, changes in the oral aperture, infection, scarring, bleeding, prolonged wound healing, incomplete removal, allergy to anesthesia, nerve injury and recurrence were addressed. Prior to the procedure, the treatment site was clearly identified and confirmed by the patient. All components of Universal Protocol/PAUSE Rule completed.
Staged Advancement Flap Text: The defect edges were debeveled with a #15 scalpel blade.  Given the location of the defect, shape of the defect and the proximity to free margins a staged advancement flap was deemed most appropriate.  Using a sterile surgical marker, an appropriate advancement flap was drawn incorporating the defect and placing the expected incisions within the relaxed skin tension lines where possible. The area thus outlined was incised deep to adipose tissue with a #15 scalpel blade.  The skin margins were undermined to an appropriate distance in all directions utilizing iris scissors.

## 2021-08-04 ENCOUNTER — ANTICOAGULATION MONITORING (OUTPATIENT)
Dept: VASCULAR LAB | Facility: MEDICAL CENTER | Age: 86
End: 2021-08-04

## 2021-08-04 DIAGNOSIS — Z86.711 HISTORY OF PULMONARY EMBOLISM: ICD-10-CM

## 2021-08-04 LAB — INR PPP: 3 (ref 2–3.5)

## 2021-08-04 NOTE — PROGRESS NOTES
Anticoagulation Summary  As of 8/4/2021    INR goal:  2.0-3.0   TTR:  80.5 % (3.6 y)   INR used for dosing:  3.00 (8/4/2021)   Warfarin maintenance plan:  1.25 mg (2.5 mg x 0.5) every Mon, Fri; 2.5 mg (2.5 mg x 1) all other days   Weekly warfarin total:  15 mg   Plan last modified:  Kelin Hatfield, Pharmacy Intern (7/21/2021)   Next INR check:  8/11/2021   Target end date:  Indefinite    Indications    History of pulmonary embolism [Z86.711]             Anticoagulation Episode Summary     INR check location:      Preferred lab:      Send INR reminders to:      Comments:  [facility]        Anticoagulation Care Providers     Provider Role Specialty Phone number    SHERLY Lees Responsible Cardiology [phone]          Refer to Anticoagulation Patient Findings for HPI  Patient Findings     Negatives:  Signs/symptoms of thrombosis, Signs/symptoms of bleeding, Laboratory test error suspected, Change in health, Change in alcohol use, Change in activity, Upcoming invasive procedure, Emergency department visit, Upcoming dental procedure, Missed doses, Extra doses, Change in medications, Change in diet/appetite, Hospital admission, Bruising, Other complaints          Spoke with patient's wife to report a therapeutic INR.      Pt is NOT on antiplatelet therapy.    Pt instructed to continue with current warfarin dosing regimen, confirms dosing.   Will follow up in 1 week(s).     Zachary Gundersen, Pharmacy Intern

## 2021-08-11 ENCOUNTER — ANTICOAGULATION MONITORING (OUTPATIENT)
Dept: VASCULAR LAB | Facility: MEDICAL CENTER | Age: 86
End: 2021-08-11

## 2021-08-11 DIAGNOSIS — Z86.711 HISTORY OF PULMONARY EMBOLISM: ICD-10-CM

## 2021-08-11 LAB
INR PPP: 3.1 (ref 2–3.5)
INR PPP: 3.1 (ref 2–3.5)

## 2021-08-11 NOTE — PROGRESS NOTES
OP Telephone Anticoagulation Service Note    Date: 8/11/2021      Anticoagulation Summary  As of 8/11/2021    INR goal:  2.0-3.0   TTR:  80.1 % (3.6 y)   INR used for dosing:  3.10 (8/11/2021)   Warfarin maintenance plan:  1.25 mg (2.5 mg x 0.5) every Mon, Fri; 2.5 mg (2.5 mg x 1) all other days   Weekly warfarin total:  15 mg   Plan last modified:  Kelin Hatfield, Pharmacy Intern (7/21/2021)   Next INR check:  8/18/2021   Target end date:  Indefinite    Indications    History of pulmonary embolism [Z86.711]             Anticoagulation Episode Summary     INR check location:      Preferred lab:      Send INR reminders to:      Comments:  [facility]        Anticoagulation Care Providers     Provider Role Specialty Phone number    SHERLY Lees Responsible Cardiology [phone]        Anticoagulation Patient Findings  Patient Findings     Negatives:  Signs/symptoms of thrombosis, Signs/symptoms of bleeding, Laboratory test error suspected, Change in health, Change in alcohol use, Change in activity, Upcoming invasive procedure, Emergency department visit, Upcoming dental procedure, Missed doses, Extra doses, Change in medications, Change in diet/appetite, Hospital admission, Bruising, Other complaints          INR supratherapeutic at 3.1.  Spoke w/ pt's wife on phone.  Verified regimen w/ pt's wife- reduce tomorrow's dose by 1/2 tablet, then continue same dose for the rest of the week, then recheck INR.  NO s/s bleeding reported per pt.  NO changes in diet reported per pt.  NO changes in medications reported per pt.  Check INR in 1 week(s).  Instructed pt's wife to call clinic at [phone] if there are any questions.  Pt's wife stated understanding.    Pt is not on antiplatelet therapy.    Cathie Collins, Pharmacy Intern, discussed with Lina KitchenD.

## 2021-08-12 ENCOUNTER — ANTICOAGULATION MONITORING (OUTPATIENT)
Dept: MEDICAL GROUP | Facility: MEDICAL CENTER | Age: 86
End: 2021-08-12

## 2021-08-12 DIAGNOSIS — Z86.711 HISTORY OF PULMONARY EMBOLISM: ICD-10-CM

## 2021-08-18 ENCOUNTER — ANTICOAGULATION MONITORING (OUTPATIENT)
Dept: VASCULAR LAB | Facility: MEDICAL CENTER | Age: 86
End: 2021-08-18

## 2021-08-18 DIAGNOSIS — Z86.711 HISTORY OF PULMONARY EMBOLISM: ICD-10-CM

## 2021-08-18 LAB — INR PPP: 3.1 (ref 2–3.5)

## 2021-08-18 NOTE — PROGRESS NOTES
Anticoagulation Summary  As of 8/18/2021    INR goal:  2.0-3.0   TTR:  79.7 % (3.7 y)   INR used for dosing:  3.10 (8/18/2021)   Warfarin maintenance plan:  1.25 mg (2.5 mg x 0.5) every Tue, Thu, Sat; 2.5 mg (2.5 mg x 1) all other days   Weekly warfarin total:  13.75 mg   Plan last modified:  Miki Chandler Pharmacy Intern (8/18/2021)   Next INR check:  8/25/2021   Target end date:  Indefinite    Indications    History of pulmonary embolism [Z86.711]             Anticoagulation Episode Summary     INR check location:      Preferred lab:      Send INR reminders to:      Comments:  [facility]        Anticoagulation Care Providers     Provider Role Specialty Phone number    SHERLY Lees [facility] Cardiology [phone]        Anticoagulation Patient Findings  Patient Findings     Negatives:  Signs/symptoms of thrombosis, Signs/symptoms of bleeding, Laboratory test error suspected, Change in health, Change in alcohol use, Change in activity, Upcoming invasive procedure, Emergency department visit, Upcoming dental procedure, Missed doses, Extra doses, Change in medications, Change in diet/appetite, Hospital admission, Bruising, Other complaints           Spoke with patient today regarding supratherapeutic INR of 3.1.  Patient denies any signs/symptoms of bruising or bleeding or any changes in diet and medications.  Instructed patient to call clinic with any questions or concerns.    Instructed pt to take half his dose today, then continue with regular regimen.    Follow up in 1 week, to reduce risk of adverse events related to this high risk medication,  Warfarin.    Jodie Rhodes Intern

## 2021-08-24 ENCOUNTER — APPOINTMENT (RX ONLY)
Dept: URBAN - METROPOLITAN AREA CLINIC 38 | Facility: CLINIC | Age: 86
Setting detail: DERMATOLOGY
End: 2021-08-24

## 2021-08-24 DIAGNOSIS — I83029 VARICOSE VEINS OF LOWER EXTREMITIES WITH ULCER: ICD-10-CM

## 2021-08-24 DIAGNOSIS — Z71.89 OTHER SPECIFIED COUNSELING: ICD-10-CM

## 2021-08-24 DIAGNOSIS — I83009 VARICOSE VEINS OF LOWER EXTREMITIES WITH ULCER: ICD-10-CM

## 2021-08-24 DIAGNOSIS — L57.0 ACTINIC KERATOSIS: ICD-10-CM

## 2021-08-24 DIAGNOSIS — I83019 VARICOSE VEINS OF LOWER EXTREMITIES WITH ULCER: ICD-10-CM

## 2021-08-24 PROBLEM — C44.319 BASAL CELL CARCINOMA OF SKIN OF OTHER PARTS OF FACE: Status: ACTIVE | Noted: 2021-08-24

## 2021-08-24 PROBLEM — I83.018 VARICOSE VEINS OF RIGHT LOWER EXTREMITY WITH ULCER OTHER PART OF LOWER LEG: Status: ACTIVE | Noted: 2021-08-24

## 2021-08-24 PROCEDURE — 99213 OFFICE O/P EST LOW 20 MIN: CPT | Mod: 25

## 2021-08-24 PROCEDURE — ? LIQUID NITROGEN

## 2021-08-24 PROCEDURE — 17000 DESTRUCT PREMALG LESION: CPT

## 2021-08-24 PROCEDURE — ? DEFER

## 2021-08-24 PROCEDURE — ? COUNSELING

## 2021-08-24 ASSESSMENT — LOCATION ZONE DERM
LOCATION ZONE: FACE
LOCATION ZONE: NECK
LOCATION ZONE: LEG

## 2021-08-24 ASSESSMENT — LOCATION DETAILED DESCRIPTION DERM
LOCATION DETAILED: RIGHT INFERIOR POSTERIOR NECK
LOCATION DETAILED: LEFT CENTRAL MALAR CHEEK
LOCATION DETAILED: LEFT SUPERIOR CENTRAL MALAR CHEEK
LOCATION DETAILED: RIGHT DISTAL PRETIBIAL REGION

## 2021-08-24 ASSESSMENT — LOCATION SIMPLE DESCRIPTION DERM
LOCATION SIMPLE: LEFT CHEEK
LOCATION SIMPLE: RIGHT PRETIBIAL REGION
LOCATION SIMPLE: POSTERIOR NECK

## 2021-08-24 NOTE — PROCEDURE: LIQUID NITROGEN
Show Aperture Variable?: Yes
Duration Of Freeze Thaw-Cycle (Seconds): 0
Detail Level: Detailed
Render Note In Bullet Format When Appropriate: No
Post-Care Instructions: I reviewed with the patient in detail post-care instructions. Patient is to wear sunprotection, and avoid picking at any of the treated lesions. Pt may apply Vaseline to crusted or scabbing areas.
Consent: The patient's consent was obtained including but not limited to risks of crusting, scabbing, blistering, scarring, darker or lighter pigmentary change, recurrence, incomplete removal and infection.

## 2021-08-24 NOTE — PROCEDURE: DEFER
Introduction Text (Please End With A Colon): Schedule:
Detail Level: Simple
Scheduling Instructions (Optional): ED&C

## 2021-08-25 ENCOUNTER — ANTICOAGULATION MONITORING (OUTPATIENT)
Dept: VASCULAR LAB | Facility: MEDICAL CENTER | Age: 86
End: 2021-08-25

## 2021-08-25 DIAGNOSIS — Z86.711 HISTORY OF PULMONARY EMBOLISM: ICD-10-CM

## 2021-08-25 LAB — INR PPP: 2.6 (ref 2–3.5)

## 2021-08-25 NOTE — PROGRESS NOTES
Anticoagulation Summary  As of 2021    INR goal:  2.0-3.0   TTR:  79.7 % (3.7 y)   INR used for dosin.60 (2021)   Warfarin maintenance plan:  1.25 mg (2.5 mg x 0.5) every Tue, Thu, Sat; 2.5 mg (2.5 mg x 1) all other days   Weekly warfarin total:  13.75 mg   Plan last modified:  Miki Chandler, Pharmacy Intern (2021)   Next INR check:  2021   Target end date:  Indefinite    Indications    History of pulmonary embolism [Z86.711]             Anticoagulation Episode Summary     INR check location:      Preferred lab:      Send INR reminders to:      Comments:  [facility]        Anticoagulation Care Providers     Provider Role Specialty Phone number    SHERLY Lees [facility] Cardiology [phone]        Anticoagulation Patient Findings  Patient Findings     Negatives:  Signs/symptoms of thrombosis, Signs/symptoms of bleeding, Laboratory test error suspected, Change in health, Change in alcohol use, Change in activity, Upcoming invasive procedure, Emergency department visit, Upcoming dental procedure, Missed doses, Extra doses, Change in medications, Change in diet/appetite, Hospital admission, Bruising, Other complaints        Spoke with patient today regarding therapeutic INR of 2.6.  Patient denies any signs/symptoms of bruising or bleeding or any changes in diet and medications.  Instructed patient to call clinic with any questions or concerns.  Pt is to continue with current warfarin dosing regimen.  Follow up in 2 weeks, to reduce risk of adverse events related to this high risk medication,  Warfarin.    Keenan Griggs, PharmD, BCACP

## 2021-09-01 ENCOUNTER — APPOINTMENT (RX ONLY)
Dept: URBAN - METROPOLITAN AREA CLINIC 38 | Facility: CLINIC | Age: 86
Setting detail: DERMATOLOGY
End: 2021-09-01

## 2021-09-01 DIAGNOSIS — L57.0 ACTINIC KERATOSIS: ICD-10-CM

## 2021-09-01 PROBLEM — C44.319 BASAL CELL CARCINOMA OF SKIN OF OTHER PARTS OF FACE: Status: ACTIVE | Noted: 2021-09-01

## 2021-09-01 PROCEDURE — 17283 DSTR MAL LS F/E/E/N/L/M2.1-3: CPT | Mod: 79

## 2021-09-01 PROCEDURE — ? COUNSELING

## 2021-09-01 PROCEDURE — 17000 DESTRUCT PREMALG LESION: CPT | Mod: 59,79

## 2021-09-01 PROCEDURE — ? LIQUID NITROGEN

## 2021-09-01 PROCEDURE — 17282 DSTR MAL LS F/E/E/N/L/M1.1-2: CPT | Mod: 79

## 2021-09-01 PROCEDURE — ? CURETTAGE AND DESTRUCTION

## 2021-09-01 ASSESSMENT — LOCATION SIMPLE DESCRIPTION DERM: LOCATION SIMPLE: SUPERIOR FOREHEAD

## 2021-09-01 ASSESSMENT — LOCATION ZONE DERM: LOCATION ZONE: FACE

## 2021-09-01 ASSESSMENT — LOCATION DETAILED DESCRIPTION DERM: LOCATION DETAILED: SUPERIOR MID FOREHEAD

## 2021-09-01 NOTE — PROCEDURE: CURETTAGE AND DESTRUCTION

## 2021-09-08 ENCOUNTER — ANTICOAGULATION MONITORING (OUTPATIENT)
Dept: VASCULAR LAB | Facility: MEDICAL CENTER | Age: 86
End: 2021-09-08

## 2021-09-08 DIAGNOSIS — Z86.711 HISTORY OF PULMONARY EMBOLISM: ICD-10-CM

## 2021-09-08 LAB — INR PPP: 2.9 (ref 2–3.5)

## 2021-09-08 NOTE — PROGRESS NOTES
OP Telephone Anticoagulation Service Note    Date: 2021      Anticoagulation Summary  As of 2021    INR goal:  2.0-3.0   TTR:  79.9 % (3.7 y)   INR used for dosin.90 (2021)   Warfarin maintenance plan:  1.25 mg (2.5 mg x 0.5) every Tue, Thu, Sat; 2.5 mg (2.5 mg x 1) all other days   Weekly warfarin total:  13.75 mg   Plan last modified:  Miki Chandler, Pharmacy Intern (2021)   Next INR check:  2021   Target end date:  Indefinite    Indications    History of pulmonary embolism [Z86.711]             Anticoagulation Episode Summary     INR check location:      Preferred lab:      Send INR reminders to:      Comments:  [facility]        Anticoagulation Care Providers     Provider Role Specialty Phone number    SHERLY Lese Responsible Cardiovascular Disease (Cardiology) [phone]        Anticoagulation Patient Findings  Patient Findings     Negatives:  Signs/symptoms of thrombosis, Signs/symptoms of bleeding, Laboratory test error suspected, Change in health, Change in alcohol use, Change in activity, Upcoming invasive procedure, Emergency department visit, Upcoming dental procedure, Missed doses, Extra doses, Change in medications, Change in diet/appetite, Hospital admission, Bruising, Other complaints          INR therapeutic at 2.9.  Spoke w/ pt on phone.  Verified regimen w/ pt.  Instructed pt to continue current warfarin regimen.  NO s/s bleeding reported per pt.  NO changes in diet reported per pt.  NO changes in medications reported per pt.  Check INR in 2 week(s).  Instructed pt to call clinic at [phone] if there are any questions.  Pt stated understanding.    Pt is not on antiplatelet therapy.    Cathie Collins, Pharmacy Intern.

## 2021-09-22 ENCOUNTER — ANTICOAGULATION MONITORING (OUTPATIENT)
Dept: VASCULAR LAB | Facility: MEDICAL CENTER | Age: 86
End: 2021-09-22

## 2021-09-22 DIAGNOSIS — Z86.711 HISTORY OF PULMONARY EMBOLISM: ICD-10-CM

## 2021-09-22 LAB — INR PPP: 3.1 (ref 2–3.5)

## 2021-09-22 NOTE — PROGRESS NOTES
Anticoagulation Summary  As of 9/22/2021    INR goal:  2.0-3.0   TTR:  79.6 % (3.8 y)   INR used for dosing:  3.10 (9/22/2021)   Warfarin maintenance plan:  2.5 mg (2.5 mg x 1) every Mon, Wed, Fri; 1.25 mg (2.5 mg x 0.5) all other days   Weekly warfarin total:  12.5 mg   Plan last modified:  Claire Melendez (9/22/2021)   Next INR check:  10/6/2021   Target end date:  Indefinite    Indications    History of pulmonary embolism [Z86.711]             Anticoagulation Episode Summary     INR check location:      Preferred lab:      Send INR reminders to:      Comments:  [facility]        Anticoagulation Care Providers     Provider Role Specialty Phone number    SHERLY Lees Responsible Cardiovascular Disease (Cardiology) [phone]        Anticoagulation Patient Findings       Spoke with Jing to report a supra therapeutic INR of 3.1.  Will decrease weekly dose by ~9%.Follow up in 2 weeks, to reduce the risk of adverse events related to this high risk medication, warfarin.    Claire Melendez, Clinical Pharmacist

## 2021-10-06 ENCOUNTER — ANTICOAGULATION MONITORING (OUTPATIENT)
Dept: VASCULAR LAB | Facility: MEDICAL CENTER | Age: 86
End: 2021-10-06

## 2021-10-06 DIAGNOSIS — Z86.711 HISTORY OF PULMONARY EMBOLISM: ICD-10-CM

## 2021-10-06 LAB — INR PPP: 2.6 (ref 2–3.5)

## 2021-10-06 NOTE — PROGRESS NOTES
Anticoagulation Summary  As of 10/6/2021    INR goal:  2.0-3.0   TTR:  79.6 % (3.8 y)   INR used for dosin.60 (10/6/2021)   Warfarin maintenance plan:  2.5 mg (2.5 mg x 1) every Mon, Wed, Fri; 1.25 mg (2.5 mg x 0.5) all other days   Weekly warfarin total:  12.5 mg   Plan last modified:  Keenan Griggs PharmD (10/6/2021)   Next INR check:  10/20/2021   Target end date:  Indefinite    Indications    History of pulmonary embolism [Z86.711]             Anticoagulation Episode Summary     INR check location:      Preferred lab:      Send INR reminders to:      Comments:  [facility]        Anticoagulation Care Providers     Provider Role Specialty Phone number    SHERLY Lees Responsible Cardiovascular Disease (Cardiology) [phone]        Anticoagulation Patient Findings  Patient Findings     Negatives:  Signs/symptoms of thrombosis, Signs/symptoms of bleeding, Laboratory test error suspected, Change in health, Change in alcohol use, Change in activity, Upcoming invasive procedure, Emergency department visit, Upcoming dental procedure, Missed doses, Extra doses, Change in medications, Change in diet/appetite, Hospital admission, Bruising, Other complaints        Spoke with patient today regarding therapeutic INR of 2.6.  Patient denies any signs/symptoms of bruising or bleeding or any changes in diet and medications.  Instructed patient to call clinic with any questions or concerns.    Pt is not on antiplatelet therapy    Pt is to continue with current warfarin dosing regimen.  Follow up in 2-3 weeks, to reduce risk of adverse events related to this high risk medication,  Warfarin.    Keenan Griggs, PharmD, BCACP

## 2021-11-09 ENCOUNTER — TELEPHONE (OUTPATIENT)
Dept: VASCULAR LAB | Facility: MEDICAL CENTER | Age: 86
End: 2021-11-09

## 2021-11-09 ENCOUNTER — ANTICOAGULATION MONITORING (OUTPATIENT)
Dept: VASCULAR LAB | Facility: MEDICAL CENTER | Age: 86
End: 2021-11-09

## 2021-11-09 DIAGNOSIS — Z86.711 HISTORY OF PULMONARY EMBOLISM: ICD-10-CM

## 2021-11-09 NOTE — PROGRESS NOTES
Discharged from [facility].       Pt has .  Claire Melendez, Clinical Pharmacist, CDE, CACP